# Patient Record
Sex: FEMALE | Race: WHITE | Employment: FULL TIME | URBAN - METROPOLITAN AREA
[De-identification: names, ages, dates, MRNs, and addresses within clinical notes are randomized per-mention and may not be internally consistent; named-entity substitution may affect disease eponyms.]

---

## 2017-02-10 ENCOUNTER — HOSPITAL ENCOUNTER (OUTPATIENT)
Dept: ULTRASOUND IMAGING | Age: 52
Discharge: HOME | End: 2017-02-10
Attending: FAMILY MEDICINE
Payer: COMMERCIAL

## 2017-02-10 DIAGNOSIS — R79.89 ELEVATED LFTS: ICD-10-CM

## 2017-02-10 PROCEDURE — 76700 US EXAM ABDOM COMPLETE: CPT

## 2017-02-11 ENCOUNTER — APPOINTMENT (OUTPATIENT)
Dept: CT IMAGING | Age: 52
End: 2017-02-11
Attending: EMERGENCY MEDICINE
Payer: COMMERCIAL

## 2017-02-11 ENCOUNTER — HOSPITAL ENCOUNTER (EMERGENCY)
Age: 52
Discharge: HOME | End: 2017-02-11
Attending: EMERGENCY MEDICINE
Payer: COMMERCIAL

## 2017-02-11 VITALS
RESPIRATION RATE: 16 BRPM | DIASTOLIC BLOOD PRESSURE: 94 MMHG | SYSTOLIC BLOOD PRESSURE: 140 MMHG | HEIGHT: 66 IN | OXYGEN SATURATION: 96 % | TEMPERATURE: 98.6 F | BODY MASS INDEX: 33.75 KG/M2 | HEART RATE: 86 BPM | WEIGHT: 210 LBS

## 2017-02-11 DIAGNOSIS — R10.13 ABDOMINAL PAIN, EPIGASTRIC: Primary | ICD-10-CM

## 2017-02-11 LAB
ALBUMIN SERPL BCP-MCNC: 3.6 G/DL (ref 3.5–5)
ALBUMIN/GLOB SERPL: 1.1 {RATIO} (ref 1.2–3.5)
ALP SERPL-CCNC: 181 U/L (ref 50–136)
ALT SERPL-CCNC: 240 U/L (ref 12–65)
ANION GAP BLD CALC-SCNC: 9 MMOL/L (ref 7–16)
AST SERPL W P-5'-P-CCNC: 299 U/L (ref 15–37)
BASOPHILS # BLD AUTO: 0 K/UL (ref 0–0.2)
BASOPHILS # BLD: 0 % (ref 0–2)
BILIRUB SERPL-MCNC: 0.8 MG/DL (ref 0.2–1.1)
BUN SERPL-MCNC: 15 MG/DL (ref 6–23)
CALCIUM SERPL-MCNC: 8 MG/DL (ref 8.3–10.4)
CHLORIDE SERPL-SCNC: 107 MMOL/L (ref 98–107)
CO2 SERPL-SCNC: 28 MMOL/L (ref 21–32)
CREAT SERPL-MCNC: 0.92 MG/DL (ref 0.6–1)
DIFFERENTIAL METHOD BLD: ABNORMAL
EOSINOPHIL # BLD: 0.3 K/UL (ref 0–0.8)
EOSINOPHIL NFR BLD: 3 % (ref 0.5–7.8)
ERYTHROCYTE [DISTWIDTH] IN BLOOD BY AUTOMATED COUNT: 12.8 % (ref 11.9–14.6)
GLOBULIN SER CALC-MCNC: 3.2 G/DL (ref 2.3–3.5)
GLUCOSE SERPL-MCNC: 167 MG/DL (ref 65–100)
HCT VFR BLD AUTO: 40.4 % (ref 35.8–46.3)
HGB BLD-MCNC: 13.9 G/DL (ref 11.7–15.4)
IMM GRANULOCYTES # BLD: 0 K/UL (ref 0–0.5)
IMM GRANULOCYTES NFR BLD AUTO: 0.1 % (ref 0–5)
LIPASE SERPL-CCNC: 200 U/L (ref 73–393)
LYMPHOCYTES # BLD AUTO: 17 % (ref 13–44)
LYMPHOCYTES # BLD: 1.4 K/UL (ref 0.5–4.6)
MCH RBC QN AUTO: 30.1 PG (ref 26.1–32.9)
MCHC RBC AUTO-ENTMCNC: 34.4 G/DL (ref 31.4–35)
MCV RBC AUTO: 87.4 FL (ref 79.6–97.8)
MONOCYTES # BLD: 0.6 K/UL (ref 0.1–1.3)
MONOCYTES NFR BLD AUTO: 8 % (ref 4–12)
NEUTS SEG # BLD: 5.8 K/UL (ref 1.7–8.2)
NEUTS SEG NFR BLD AUTO: 72 % (ref 43–78)
PLATELET # BLD AUTO: 295 K/UL (ref 150–450)
PMV BLD AUTO: 10 FL (ref 10.8–14.1)
POTASSIUM SERPL-SCNC: 3.5 MMOL/L (ref 3.5–5.1)
PROT SERPL-MCNC: 6.8 G/DL (ref 6.3–8.2)
RBC # BLD AUTO: 4.62 M/UL (ref 4.05–5.25)
SODIUM SERPL-SCNC: 144 MMOL/L (ref 136–145)
WBC # BLD AUTO: 8.1 K/UL (ref 4.3–11.1)

## 2017-02-11 PROCEDURE — 85025 COMPLETE CBC W/AUTO DIFF WBC: CPT | Performed by: EMERGENCY MEDICINE

## 2017-02-11 PROCEDURE — 99284 EMERGENCY DEPT VISIT MOD MDM: CPT | Performed by: EMERGENCY MEDICINE

## 2017-02-11 PROCEDURE — 74177 CT ABD & PELVIS W/CONTRAST: CPT

## 2017-02-11 PROCEDURE — 81003 URINALYSIS AUTO W/O SCOPE: CPT | Performed by: EMERGENCY MEDICINE

## 2017-02-11 PROCEDURE — 83690 ASSAY OF LIPASE: CPT | Performed by: EMERGENCY MEDICINE

## 2017-02-11 PROCEDURE — 74011000258 HC RX REV CODE- 258: Performed by: EMERGENCY MEDICINE

## 2017-02-11 PROCEDURE — 80053 COMPREHEN METABOLIC PANEL: CPT | Performed by: EMERGENCY MEDICINE

## 2017-02-11 PROCEDURE — 74011636320 HC RX REV CODE- 636/320: Performed by: EMERGENCY MEDICINE

## 2017-02-11 RX ORDER — PANTOPRAZOLE SODIUM 40 MG/1
40 TABLET, DELAYED RELEASE ORAL DAILY
Qty: 20 TAB | Refills: 0 | Status: SHIPPED | OUTPATIENT
Start: 2017-02-11 | End: 2017-03-03

## 2017-02-11 RX ORDER — SODIUM CHLORIDE 0.9 % (FLUSH) 0.9 %
10 SYRINGE (ML) INJECTION
Status: COMPLETED | OUTPATIENT
Start: 2017-02-11 | End: 2017-02-11

## 2017-02-11 RX ORDER — ONDANSETRON 4 MG/1
4 TABLET, ORALLY DISINTEGRATING ORAL
Qty: 20 TAB | Refills: 0 | Status: SHIPPED | OUTPATIENT
Start: 2017-02-11 | End: 2017-06-12 | Stop reason: CLARIF

## 2017-02-11 RX ORDER — HYOSCYAMINE SULFATE 0.125 MG
125 TABLET ORAL
Qty: 20 TAB | Refills: 0 | Status: SHIPPED | OUTPATIENT
Start: 2017-02-11 | End: 2017-06-12 | Stop reason: CLARIF

## 2017-02-11 RX ADMIN — Medication 10 ML: at 17:41

## 2017-02-11 RX ADMIN — SODIUM CHLORIDE 100 ML: 900 INJECTION, SOLUTION INTRAVENOUS at 17:41

## 2017-02-11 RX ADMIN — IOPAMIDOL 100 ML: 755 INJECTION, SOLUTION INTRAVENOUS at 17:41

## 2017-02-11 NOTE — ED PROVIDER NOTES
HPI Comments: 44 yo female with a history of HTN, DM, hyperlipidemia, hypothyroid, and remote breast cancer presents to ED with ~ 1 month of intermittent epigastric spasm type pain. No precipitating factors but vomiting makes pain better. Patient has seen her PMD regarding this issue and had an abdominal ultrasound yesterday that was essentially normal.  Patient came to ED today because she had attack earlier today that has since resolved. She denies fever/chills. No lower abdominal pain. No chest pain, back pain, or shortness of breath. Patient with previous cholecystectomy. Patient is a 45 y.o. female presenting with abdominal pain. The history is provided by the patient and medical records. Abdominal Pain    Associated symptoms include nausea and vomiting. Pertinent negatives include no fever, no diarrhea, no dysuria, no hematuria, no arthralgias and no chest pain.         Past Medical History:   Diagnosis Date    Cancer [facility])      Breast cancer at 33 yoa    Depression     Diabetes ([facility] [address]     HSV infection 9/16/2016    Hypercholesterolemia     Hypertension     Hypothyroid      2/2 radiation from breast cancer and thyroidectomy       Past Surgical History:   Procedure Laterality Date    Hx cholecystectomy       2008    Hx heent  2012     thyroid    Hx colonoscopy  2008     Dr Serena Carson Hx gyn  1993     hysterectomy    Hx breast lumpectomy Left 1994         Family History:   Problem Relation Age of Onset    Heart defect Mother     Hypertension Mother     High Cholesterol Mother     Diabetes Father     Hypertension Father     High Cholesterol Father     Cancer Maternal Grandmother     Heart Attack Maternal Grandfather     Diabetes Paternal Grandmother     Diabetes Paternal Grandfather        Social History     Social History    Marital status:      Spouse name: N/A    Number of children: N/A    Years of education: N/A     Occupational History          Social History Main Topics    Smoking status: Never Smoker    Smokeless tobacco: Never Used    Alcohol use No    Drug use: No    Sexual activity: Not on file     Other Topics Concern    Not on file     Social History Narrative    Live at home with . Step daughter that comes every other weekend. ALLERGIES: Metformin    Review of Systems   Constitutional: Negative for chills, fatigue and fever. HENT: Negative for congestion, rhinorrhea and sore throat. Eyes: Negative for pain and discharge. Respiratory: Negative for chest tightness and shortness of breath. Cardiovascular: Negative for chest pain, palpitations and leg swelling. Gastrointestinal: Positive for abdominal pain, nausea and vomiting. Negative for diarrhea. Endocrine: Negative for cold intolerance and heat intolerance. Genitourinary: Negative for dysuria, flank pain and hematuria. Musculoskeletal: Negative for arthralgias, joint swelling and neck stiffness. Skin: Negative for pallor and wound. Allergic/Immunologic: Negative for environmental allergies. Neurological: Negative for dizziness, syncope, speech difficulty, weakness, light-headedness and numbness. Hematological: Negative for adenopathy. Psychiatric/Behavioral: Negative for suicidal ideas. Vitals:    02/11/17 1434   BP: 109/73   Pulse: 90   Resp: 18   Temp: 99.4 °F (37.4 °C)   SpO2: 94%   Weight: 95.3 kg (210 lb)   Height: 5' 6\" (1.676 m)            Physical Exam   Constitutional: She is oriented to person, place, and time. She appears well-developed and well-nourished. No distress. HENT:   Head: Normocephalic and atraumatic. Eyes: Conjunctivae and EOM are normal.   Neck: Normal range of motion. Neck supple. No tracheal deviation present. Cardiovascular: Normal rate, regular rhythm and intact distal pulses. Pulmonary/Chest: Effort normal and breath sounds normal. No respiratory distress. Abdominal: Soft.    No tenderness with palpation of abdomen    Musculoskeletal: Normal range of motion. She exhibits no edema or tenderness. Neurological: She is alert and oriented to person, place, and time. She has normal strength. No cranial nerve deficit or sensory deficit. Skin: Skin is warm and dry. She is not diaphoretic. Psychiatric: She has a normal mood and affect. MDM  Number of Diagnoses or Management Options  Diagnosis management comments: Labs reviewed, elevated lives enzymes for which patient has had recently. CT A/P with no acute findings. Patient with intermittent epigastric abdominal pain and nausea/vomiting and abnormal liver enzymes, will need to follow up with GI for further workup.     ED Course       Procedures

## 2017-02-11 NOTE — DISCHARGE INSTRUCTIONS

## 2017-02-11 NOTE — ED TRIAGE NOTES
Pt reports upper abdominal pain since January this year. Pt states pain comes and goes. Pt reports vomiting 6 times today.

## 2017-02-13 NOTE — PROGRESS NOTES
Called and spoke to pt. Pt stated she got her ultrasound results when she was at the ER. Pt stated they gave her a name of a GI doctor and she is going to call. Advised that Dr. Evelyn Courtney has put in a referral for GI.

## 2017-02-17 ENCOUNTER — HOSPITAL ENCOUNTER (OUTPATIENT)
Dept: MRI IMAGING | Age: 52
Discharge: HOME | End: 2017-02-17
Attending: NURSE PRACTITIONER
Payer: COMMERCIAL

## 2017-02-17 DIAGNOSIS — R10.13 EPIGASTRIC ABDOMINAL PAIN: ICD-10-CM

## 2017-02-17 DIAGNOSIS — R74.8 ABNORMAL LIVER ENZYMES: ICD-10-CM

## 2017-02-17 PROCEDURE — 74181 MRI ABDOMEN W/O CONTRAST: CPT

## 2017-02-20 ENCOUNTER — HOSPITAL ENCOUNTER (OUTPATIENT)
Dept: LAB | Age: 52
Discharge: HOME | End: 2017-02-20

## 2017-02-20 PROCEDURE — 88312 SPECIAL STAINS GROUP 1: CPT | Performed by: INTERNAL MEDICINE

## 2017-02-20 PROCEDURE — 88305 TISSUE EXAM BY PATHOLOGIST: CPT | Performed by: INTERNAL MEDICINE

## 2017-03-10 ENCOUNTER — HOSPITAL ENCOUNTER (OUTPATIENT)
Dept: GENERAL RADIOLOGY | Age: 52
Discharge: HOME | End: 2017-03-10
Payer: COMMERCIAL

## 2017-03-10 DIAGNOSIS — T85.528A: ICD-10-CM

## 2017-03-10 PROCEDURE — 74000 XR ABD (KUB): CPT

## 2017-06-12 ENCOUNTER — ANESTHESIA EVENT (OUTPATIENT)
Dept: ENDOSCOPY | Age: 52
End: 2017-06-12
Payer: COMMERCIAL

## 2017-06-12 RX ORDER — SODIUM CHLORIDE 0.9 % (FLUSH) 0.9 %
5-10 SYRINGE (ML) INJECTION AS NEEDED
Status: CANCELLED | OUTPATIENT
Start: 2017-06-12

## 2017-06-12 RX ORDER — HYDROMORPHONE HYDROCHLORIDE 2 MG/ML
0.5 INJECTION, SOLUTION INTRAMUSCULAR; INTRAVENOUS; SUBCUTANEOUS
Status: CANCELLED | OUTPATIENT
Start: 2017-06-12

## 2017-06-12 RX ORDER — OXYCODONE HYDROCHLORIDE 5 MG/1
5 TABLET ORAL
Status: CANCELLED | OUTPATIENT
Start: 2017-06-12

## 2017-06-12 RX ORDER — OXYCODONE AND ACETAMINOPHEN 10; 325 MG/1; MG/1
1 TABLET ORAL AS NEEDED
Status: CANCELLED | OUTPATIENT
Start: 2017-06-12

## 2017-06-13 ENCOUNTER — ANESTHESIA (OUTPATIENT)
Dept: ENDOSCOPY | Age: 52
End: 2017-06-13
Payer: COMMERCIAL

## 2017-06-13 ENCOUNTER — HOSPITAL ENCOUNTER (OUTPATIENT)
Age: 52
Setting detail: OUTPATIENT SURGERY
Discharge: HOME | End: 2017-06-13
Attending: COLON & RECTAL SURGERY | Admitting: COLON & RECTAL SURGERY
Payer: COMMERCIAL

## 2017-06-13 VITALS
OXYGEN SATURATION: 95 % | HEIGHT: 66 IN | TEMPERATURE: 97.7 F | HEART RATE: 88 BPM | DIASTOLIC BLOOD PRESSURE: 89 MMHG | SYSTOLIC BLOOD PRESSURE: 139 MMHG | WEIGHT: 200 LBS | BODY MASS INDEX: 32.14 KG/M2 | RESPIRATION RATE: 18 BRPM

## 2017-06-13 LAB — GLUCOSE BLD STRIP.AUTO-MCNC: 215 MG/DL (ref 65–100)

## 2017-06-13 PROCEDURE — 82962 GLUCOSE BLOOD TEST: CPT

## 2017-06-13 PROCEDURE — 74011250636 HC RX REV CODE- 250/636: Performed by: ANESTHESIOLOGY

## 2017-06-13 PROCEDURE — 77030009426 HC FCPS BIOP ENDOSC BSC -B: Performed by: COLON & RECTAL SURGERY

## 2017-06-13 PROCEDURE — 74011000250 HC RX REV CODE- 250

## 2017-06-13 PROCEDURE — 76040000026: Performed by: COLON & RECTAL SURGERY

## 2017-06-13 PROCEDURE — 88305 TISSUE EXAM BY PATHOLOGIST: CPT | Performed by: COLON & RECTAL SURGERY

## 2017-06-13 PROCEDURE — 74011250636 HC RX REV CODE- 250/636

## 2017-06-13 PROCEDURE — 76060000032 HC ANESTHESIA 0.5 TO 1 HR: Performed by: COLON & RECTAL SURGERY

## 2017-06-13 RX ORDER — PROPOFOL 10 MG/ML
INJECTION, EMULSION INTRAVENOUS
Status: DISCONTINUED | OUTPATIENT
Start: 2017-06-13 | End: 2017-06-13 | Stop reason: HOSPADM

## 2017-06-13 RX ORDER — LIDOCAINE HYDROCHLORIDE 20 MG/ML
INJECTION, SOLUTION EPIDURAL; INFILTRATION; INTRACAUDAL; PERINEURAL AS NEEDED
Status: DISCONTINUED | OUTPATIENT
Start: 2017-06-13 | End: 2017-06-13 | Stop reason: HOSPADM

## 2017-06-13 RX ORDER — PROPOFOL 10 MG/ML
INJECTION, EMULSION INTRAVENOUS AS NEEDED
Status: DISCONTINUED | OUTPATIENT
Start: 2017-06-13 | End: 2017-06-13 | Stop reason: HOSPADM

## 2017-06-13 RX ORDER — SODIUM CHLORIDE, SODIUM LACTATE, POTASSIUM CHLORIDE, CALCIUM CHLORIDE 600; 310; 30; 20 MG/100ML; MG/100ML; MG/100ML; MG/100ML
75 INJECTION, SOLUTION INTRAVENOUS CONTINUOUS
Status: DISCONTINUED | OUTPATIENT
Start: 2017-06-13 | End: 2017-06-13 | Stop reason: HOSPADM

## 2017-06-13 RX ADMIN — LIDOCAINE HYDROCHLORIDE 40 MG: 20 INJECTION, SOLUTION EPIDURAL; INFILTRATION; INTRACAUDAL; PERINEURAL at 08:49

## 2017-06-13 RX ADMIN — PROPOFOL 180 MCG/KG/MIN: 10 INJECTION, EMULSION INTRAVENOUS at 08:49

## 2017-06-13 RX ADMIN — PROPOFOL 70 MG: 10 INJECTION, EMULSION INTRAVENOUS at 08:49

## 2017-06-13 RX ADMIN — SODIUM CHLORIDE, SODIUM LACTATE, POTASSIUM CHLORIDE, AND CALCIUM CHLORIDE 75 ML/HR: 600; 310; 30; 20 INJECTION, SOLUTION INTRAVENOUS at 08:07

## 2017-06-13 NOTE — DISCHARGE INSTRUCTIONS
Gastrointestinal Colonoscopy/Flexible Sigmoidoscopy - Lower Exam Discharge Instructions  1. Call Dr. Juan Ram at [phone] for any problems or questions. 2. Contact the doctors office for follow up appointment as directed  3. Medication may cause drowsiness for several hours, therefore, do not drive or operate machinery for remainder of the day. 4. No alcohol today. 5. Ordinarily, you may resume regular diet and activity after exam unless otherwise specified by your physician. 6. Because of air put into your colon during exam, you may experience some abdominal distension, relieved by the passage of gas, for several hours. 7. Contact your physician if you have any of the following:  a. Excessive amount of bleeding - large amount when having a bowel movement. Occasional specks of blood with bowel movement would not be unusual.  b. Severe abdominal pain  c. Fever or Chills  8. Polyp Removal - follow these additional instructions  a. Soft diet for 24 hours, then resume regular diet   b. Take Metamucil - 1 tablespoon in juice every morning for 3 days  c. No Aspirin, Advil, Aleve, Nuprin, Ibuprofen, or medications that contain these drugs for 2 weeks. Any additional instructions:    -Follow up with Dr Juan Ram in the office on an as-needed basis. -High fiber diet  -Repeat colonoscopy in 5 years due to history of polyps and polyps this exam      Instructions given to Sonido Granados and other family members.

## 2017-06-13 NOTE — ANESTHESIA PREPROCEDURE EVALUATION
Anesthetic History   No history of anesthetic complications            Review of Systems / Medical History  Patient summary reviewed and pertinent labs reviewed    Pulmonary        Sleep apnea           Neuro/Psych         Psychiatric history     Cardiovascular    Hypertension: well controlled              Exercise tolerance: <4 METS     GI/Hepatic/Renal     GERD: well controlled           Endo/Other    Diabetes: type 2  Hypothyroidism: well controlled  Morbid obesity     Other Findings              Physical Exam    Airway  Mallampati: II  TM Distance: > 6 cm  Neck ROM: normal range of motion   Mouth opening: Normal     Cardiovascular    Rhythm: regular           Dental         Pulmonary                 Abdominal  GI exam deferred       Other Findings            Anesthetic Plan    ASA: 3  Anesthesia type: total IV anesthesia          Induction: Intravenous  Anesthetic plan and risks discussed with: Patient

## 2017-06-13 NOTE — H&P
History and Physical    Patient: Cedric Lerma MRN: [id number]  SSN: [id number]    YOB: 1965  Age: 47 y.o. Sex: female      Subjective:      See my office note from 4/6/17. Cedric Lerma is a 47 y.o. female who I met to discuss colonoscopy. Had polyps in the past..     Past Medical History:   Diagnosis Date    Cancer [facility])     left Breast cancer at 33 yoa, lumpectomy    Depression     Diabetes ([facility] [address]     last hgba1c 6.9 in 03/2017, does not check daily BS, can not tell if BS drops    GERD (gastroesophageal reflux disease)     managed with meds    HSV infection 9/16/2016    Hypercholesterolemia     Hypertension     managed with meds    Hypothyroid     2/2 radiation from breast cancer and thyroidectomy    Sleep apnea     does not require cpap machine     Past Surgical History:   Procedure Laterality Date    HX BREAST LUMPECTOMY Left 1994    HX CHOLECYSTECTOMY      2008    HX COLONOSCOPY  2008    Dr Merle Deutsch    hysterectomy    HX HEENT  2012    thyroid      Family History   Problem Relation Age of Onset    Heart defect Mother     Hypertension Mother     High Cholesterol Mother     Diabetes Father     Hypertension Father     High Cholesterol Father     Cancer Maternal Grandmother     Heart Attack Maternal Grandfather     Diabetes Paternal Grandmother     Diabetes Paternal Grandfather     No Known Problems Brother      Social History   Substance Use Topics    Smoking status: Never Smoker    Smokeless tobacco: Never Used    Alcohol use No      Prior to Admission medications    Medication Sig Start Date End Date Taking? Authorizing Provider   pantoprazole (PROTONIX) 40 mg tablet daily. 3/28/17  Yes Historical Provider   EEMT HS 0.625-1.25 mg per tablet 1 Tab daily. 4/4/17  Yes Historical Provider   OTHER,NON-FORMULARY, Indications: plexus pink drink- vit supplements. Yes Historical Provider   SITagliptin (JANUVIA) 100 mg tablet Take 1 Tab by mouth daily. Indications: type 2 diabetes mellitus 4/6/17  Yes Juan Elizabeth, DO   lisinopril (PRINIVIL, ZESTRIL) 10 mg tablet Take 1 Tab by mouth daily. 4/6/17  Yes Arvjosiah Elizabeth, DO   dapagliflozin (FARXIGA) 5 mg tab tablet Take 1 Tab by mouth daily. Indications: type 2 diabetes mellitus 4/6/17  Yes Arvjosiah Elizabeth, DO   rosuvastatin (CRESTOR) 40 mg tablet Take 1 Tab by mouth nightly. Patient taking differently: Take 40 mg by mouth daily. 3/21/17  Yes Juan Elizabeth, DO   vortioxetine (TRINTELLIX) 10 mg tablet Take 1 Tab by mouth daily. 2/23/17  Yes Arvjosiah Elizabeth, DO   calcium carbonate (CALTREX) 600 mg calcium (1,500 mg) tablet Take 600 mg by mouth two (2) times a day. Yes Historical Provider   OTHER Indications: relief factor supplements: antiinflammatory   Yes Historical Provider   acyclovir (ZOVIRAX) 400 mg tablet Take 1 Tab by mouth daily. Patient taking differently: Take 400 mg by mouth daily as needed. 6/8/16  Yes Shonda Quezada PA-C   cholecalciferol, vitamin D3, (VITAMIN D3) 2,000 unit tab Take 10,000 Int'l Units by mouth. Yes Historical Provider   levothyroxine (SYNTHROID) 112 mcg tablet Take 1 Tab by mouth Daily (before breakfast). 12/27/16   Juan Elizabeth DO        Allergies   Allergen Reactions    Metformin Diarrhea       Review of Systems:  A comprehensive review of systems was negative except for that written in the History of Present Illness. Objective:     Vitals:    06/13/17 0740 06/13/17 0803   BP:  (!) 150/92   Pulse:  92   Resp:  16   Temp: 98.4 °F (36.9 °C)    SpO2:  97%   Weight: 200 lb (90.7 kg)    Height: 5' 6\" (1.676 m)         Physical Exam:  General:  Alert, cooperative, no distress, appears stated age. Eyes:  Conjunctivae/corneas clear. PERRL, EOMs intact. Fundi benign   Ears:  Normal TMs and external ear canals both ears. Nose: Nares normal. Septum midline. Mucosa normal. No drainage or sinus tenderness.    Mouth/Throat: Lips, mucosa, and tongue normal. Teeth and gums normal.   Neck: Supple, symmetrical, trachea midline, no adenopathy, thyroid: no enlargment/tenderness/nodules, no carotid bruit and no JVD. Back:   Symmetric, no curvature. ROM normal. No CVA tenderness. Lungs:   Clear to auscultation bilaterally. Heart:  Regular rate and rhythm, S1, S2 normal, no murmur, click, rub or gallop. Abdomen:   Soft, non-tender. Bowel sounds normal. No masses,  No organomegaly. Extremities: Extremities normal, atraumatic, no cyanosis or edema. Pulses: 2+ and symmetric all extremities. Skin: Skin color, texture, turgor normal. No rashes or lesions   Lymph nodes: Cervical, supraclavicular, and axillary nodes normal.   Neurologic: CNII-XII intact. Normal strength, sensation and reflexes throughout.        Assessment:     Age related colon cancer risk/screening  Personal history of colon/rectal polyps  Family history of colon polyps in father  History of IBS, currently improved       Plan:     colonoscopy    Signed By: Lawyer Randal MD     June 13, 2017

## 2017-06-13 NOTE — IP AVS SNAPSHOT
[address] 
[phone] Patient: Naye Mary MRN: [id number] :1965 You are allergic to the following Allergen Reactions Metformin Diarrhea Recent Documentation Height Weight BMI OB Status Smoking Status 1.676 m 90.7 kg 32.28 kg/m2 Hysterectomy Never Smoker Emergency Contacts Name Discharge Info Relation Home Work Mobile [place] CAREGIVER [3] Spouse [3] [phone] About your hospitalization You were admitted on:  2017 You last received care in the:  [facility] You were discharged on:  2017 Unit phone number:  [phone] Why you were hospitalized Your primary diagnosis was:  Not on File Providers Seen During Your Hospitalizations Provider Role Specialty Primary office phone Berenice Howard MD Attending Provider Colon and Rectal Surgery [phone] Your Primary Care Physician (PCP) Primary Care Physician Office Phone Office Fax [phone] [address] [phone] Follow-up Information Follow up With Details Comments Contact Info Al Moreau DO   [address] [facility] [address] 
[phone] Your Appointments 2017  8:40 AM EDT  
LAB with [facility] [address] ([address] [place] [address]) [address] [facility] [address] [phone] 2017  9:00 AM EDT  
ESTABLISHED PATIENT with Al Moreau DO  
[facility] ([address] [place] [address]) [address] [facility] [address] [phone] Current Discharge Medication List  
  
CONTINUE these medications which have CHANGED Dose & Instructions Dispensing Information Comments Morning Noon Evening Bedtime  
 acyclovir 400 mg tablet Commonly known as:  ZOVIRAX What changed:   
- when to take this 
- reasons to take this Your last dose was: Your next dose is:    
   
   
 Dose:  400 mg Take 1 Tab by mouth daily. Quantity:  90 Tab Refills:  2  
     
   
   
   
  
 rosuvastatin 40 mg tablet Commonly known as:  CRESTOR What changed:  when to take this Your last dose was: Your next dose is:    
   
   
 Dose:  40 mg Take 1 Tab by mouth nightly. Quantity:  90 Tab Refills:  1 CANCEL CRESTOR 20 MG and Zetia CONTINUE these medications which have NOT CHANGED Dose & Instructions Dispensing Information Comments Morning Noon Evening Bedtime  
 calcium carbonate 600 mg calcium (1,500 mg) tablet Commonly known as:  Refugia Priestly Your last dose was: Your next dose is:    
   
   
 Dose:  600 mg Take 600 mg by mouth two (2) times a day. Refills:  0  
     
   
   
   
  
 dapagliflozin 5 mg Tab tablet Commonly known as:  [employer] Your last dose was: Your next dose is:    
   
   
 Dose:  5 mg Take 1 Tab by mouth daily. Indications: type 2 diabetes mellitus Quantity:  30 Tab Refills:  2 CANCEL PIOGLITAZONE  
    
   
   
   
  
 EEMT HS 0.625-1.25 mg per tablet Generic drug:  estrogens (conjugated)-methylTESTOSTERone Your last dose was: Your next dose is:    
   
   
 Dose:  1 Tab 1 Tab daily. Refills:  0  
     
   
   
   
  
 levothyroxine 112 mcg tablet Commonly known as:  SYNTHROID Your last dose was: Your next dose is:    
   
   
 Dose:  112 mcg Take 1 Tab by mouth Daily (before breakfast). Quantity:  90 Tab Refills:  1  
     
   
   
   
  
 lisinopril 10 mg tablet Commonly known as:  Katelynn Harman Your last dose was: Your next dose is:    
   
   
 Dose:  10 mg Take 1 Tab by mouth daily. Quantity:  90 Tab Refills:  0  
     
   
   
   
  
 OTHER  
   
 Your last dose was: Your next dose is:    
   
   
 Indications: relief factor supplements: antiinflammatory Refills:  0  
     
   
   
   
  
 OTHER(NON-FORMULARY) Your last dose was: Your next dose is:    
   
   
 Indications: plexus pink drink- vit supplements. Refills:  0  
     
   
   
   
  
 pantoprazole 40 mg tablet Commonly known as:  PROTONIX Your last dose was: Your next dose is:    
   
   
 daily. Refills:  0 SITagliptin 100 mg tablet Commonly known as:  Murriel Bhargavi Your last dose was: Your next dose is:    
   
   
 Dose:  100 mg Take 1 Tab by mouth daily. Indications: type 2 diabetes mellitus Quantity:  30 Tab Refills:  5 Cancel metformin VITAMIN D3 2,000 unit Tab Generic drug:  cholecalciferol (vitamin D3) Your last dose was: Your next dose is:    
   
   
 Dose:  87895 Int'l Units Take 10,000 Int'l Units by mouth. Refills:  0  
     
   
   
   
  
 vortioxetine 10 mg tablet Commonly known as:  TRINTELLIX Your last dose was: Your next dose is:    
   
   
 Dose:  10 mg Take 1 Tab by mouth daily. Quantity:  30 Tab Refills:  5 Pt given discount card Discharge Instructions Gastrointestinal Colonoscopy/Flexible Sigmoidoscopy - Lower Exam Discharge Instructions 1. Call Dr. Acosta Ahmadi at [phone] for any problems or questions. 2. Contact the doctors office for follow up appointment as directed 3. Medication may cause drowsiness for several hours, therefore, do not drive or operate machinery for remainder of the day. 4. No alcohol today. 5. Ordinarily, you may resume regular diet and activity after exam unless otherwise specified by your physician. 6. Because of air put into your colon during exam, you may experience some abdominal distension, relieved by the passage of gas, for several hours. 7. Contact your physician if you have any of the following: 
a. Excessive amount of bleeding  large amount when having a bowel movement. Occasional specks of blood with bowel movement would not be unusual. 
b. Severe abdominal pain 
c. Fever or Chills 8. Polyp Removal  follow these additional instructions 
a. Soft diet for 24 hours, then resume regular diet  
b. Take Metamucil  1 tablespoon in juice every morning for 3 days 
c. No Aspirin, Advil, Aleve, Nuprin, Ibuprofen, or medications that contain these drugs for 2 weeks. Any additional instructions:   
-Follow up with Dr Africa Akins in the office on an as-needed basis. -High fiber diet 
-Repeat colonoscopy in 5 years due to history of polyps and polyps this exam 
 
 
Instructions given to Hyacinth Elders and other family members. Discharge Orders None Introducing [facility]! [employer] introduces [employer] patient portal. Now you can access parts of your medical record, email your doctor's office, and request medication refills online. 1. In your internet browser, go to [email or web address] 2. Click on the First Time User? Click Here link in the Sign In box. You will see the New Member Sign Up page. 3. Enter your [employer] Access Code exactly as it appears below. You will not need to use this code after youve completed the sign-up process. If you do not sign up before the expiration date, you must request a new code. · [employer] Access Code: [email or web address] Expires: 7/5/2017  8:49 AM 
 
4. Enter the last four digits of your Social Security Number (xxxx) and Date of Birth (mm/dd/yyyy) as indicated and click Submit. You will be taken to the next sign-up page. 5. Create a [employer] ID. This will be your [employer] login ID and cannot be changed, so think of one that is secure and easy to remember. 6. Create a [employer] password. You can change your password at any time. 7. Enter your Password Reset Question and Answer. This can be used at a later time if you forget your password. 8. Enter your e-mail address. You will receive e-mail notification when new information is available in [address] 9. Click Sign Up. You can now view and download portions of your medical record. 10. Click the Download Summary menu link to download a portable copy of your medical information. If you have questions, please visit the Frequently Asked Questions section of the [employer] website. Remember, [employer] is NOT to be used for urgent needs. For medical emergencies, dial 911. Now available from your iPhone and Android! General Information Please provide this summary of care documentation to your next provider. Patient Signature:  ____________________________________________________________ Date:  ____________________________________________________________  
  
Shala Lepee Provider Signature:  ____________________________________________________________ Date:  ____________________________________________________________

## 2017-06-13 NOTE — PROCEDURES
Colonoscopy Procedure Note    Jose Alfredo Trejo  1965  [id number]    Indications:  Screening colonoscopy, Family history of coloretal adenoma  (in father), Personal history of colon polyps    Pre-operative Diagnosis: Screening colonoscopy, Family history of coloretal adenoma  (in father), Personal history of colon polyps    Post-operative Diagnosis: Sigmoid polyp, diverticulosis    Surgeon: Francisco Toro MD    Referring Provider: Jevon Lewis DO    Sedation:  MAC anesthesia Propofol    Pre-Procedure Exam:  Airway: clear   Heart: normal S1and S2    Lungs: clear bilateral  Abdomen: soft, nontender, bowel sounds present and normal in all quadrants   Mental Status: awake, alert, and oriented to person, place, and time    Procedure in Detail:  Informed consent was obtained for the procedure after delineating the risks, benefits, and alternatives to the procedure. Specific risks of perforation (1/1000), hemorrhage (1/1000), missed lesions, adverse drug reaction, and aspiration were discussed. The patient was placed in the left lateral decubitus position. Based on the pre-procedure assessment, including review of the patient's medical history, medications, and allergies, moderate sedation was felt to be appropriate. The aforementioned medications were given in an incremental fashion to achieve adequate sedation. The patient was monitored continuously with ECG tracing, pulse oximetry, blood pressure monitoring, and direct observations. A rectal examination was performed and showed no external hemorrhoids. Small rectocele. The Olympus videocolonoscope [id number] was inserted per anus and advanced under direct vision to the terminal ileum. The quality of the colonic preparation was fair. Moderate washing/suctioning was required. Final views were very good. The colonoscope was slowly withdrawn using a to-and-fro and circumferential motion over 23 minutes with careful examination between folds. Retroflexion was performed in the rectum. Appropriate photodocumentation was obtained. Findings:   Rectum: Mild incidental internal hemorrhoids. No polyps, masses, bleeding, or mucosal abnormalities. Sigmoid: Moderate incidental diverticulosis. Single 4 mm polyp cold forceps excised. No masses, bleeding, or mucosal abnormalities. Descending Colon: Normal.  No polyps, masses, bleeding, or mucosal abnormalities. Transverse Colon: Normal. No polyps, masses, bleeding, or mucosal abnormalities. Ascending Colon: Normal.  No polyps, masses, bleeding, or mucosal abnormalities. Cecum: Normal.  No polyps, masses, bleeding, or mucosal abnormalities. Terminal Ileum: normal    Therapies:  1 complete polypectomy was performed using cold biopsy forceps and the polyp was  retrieved    Implants: None    Specimen:  specimen #1, one polyp, 4 mm in size, located in the rectosigmoid removed by cold biopsy and sent for pathology    Complications: None; patient tolerated the procedure well. EBL:  3    Recommendations:   -Await pathology.   -Repeat colonoscopy in 5 years due to history of polyps and polyp present on this exam.  -High fiber diet.    -Follow up with Dr Theodore Cruz in the office on an as-needed basis in the future    Renzo Duncan MD  6/13/2017  9:41 AM

## 2017-06-13 NOTE — ANESTHESIA POSTPROCEDURE EVALUATION
Post-Anesthesia Evaluation and Assessment    Patient: Cedric Lerma MRN: [id number]  SSN: [id number]    YOB: 1965  Age: 47 y.o. Sex: female       Cardiovascular Function/Vital Signs  Visit Vitals    /85    Pulse 76    Temp 36.5 °C (97.7 °F)    Resp 18    Ht 5' 6\" (1.676 m)    Wt 90.7 kg (200 lb)    SpO2 94%    BMI 32.28 kg/m2       Patient is status post total IV anesthesia anesthesia for Procedure(s):  COLONOSCOPY/ 34  ENDOSCOPIC POLYPECTOMY. Nausea/Vomiting: None    Postoperative hydration reviewed and adequate. Pain:  Pain Scale 1: Numeric (0 - 10) (06/13/17 7380)   Managed    Neurological Status: At baseline    Mental Status and Level of Consciousness: Arousable    Pulmonary Status:   O2 Device: Nasal cannula (06/13/17 0995)   Adequate oxygenation and airway patent    Complications related to anesthesia: None    Post-anesthesia assessment completed.  No concerns    Signed By: Taco Palomino MD     June 13, 2017

## 2017-06-14 NOTE — PROGRESS NOTES
Benign polyp, no risk of cancer (hyperplastic).   Repeat colonoscopy in 5 years due to history of polyps in past

## 2017-10-25 PROBLEM — N89.3 VAGINAL DYSPLASIA: Status: ACTIVE | Noted: 2017-08-18

## 2018-06-05 PROBLEM — F41.9 ANXIETY: Status: ACTIVE | Noted: 2018-06-05

## 2018-10-25 PROBLEM — G47.33 OSA (OBSTRUCTIVE SLEEP APNEA): Status: ACTIVE | Noted: 2018-10-25

## 2019-03-08 PROBLEM — E66.01 SEVERE OBESITY (HCC): Status: ACTIVE | Noted: 2019-03-08

## 2019-03-19 LAB — MAMMOGRAPHY, EXTERNAL: NORMAL

## 2019-05-23 PROBLEM — F33.2 SEVERE EPISODE OF RECURRENT MAJOR DEPRESSIVE DISORDER, WITHOUT PSYCHOTIC FEATURES (HCC): Status: ACTIVE | Noted: 2019-05-23

## 2019-05-23 PROBLEM — Z79.4 LONG-TERM INSULIN USE (HCC): Status: ACTIVE | Noted: 2019-05-23

## 2019-05-23 PROBLEM — Z85.3 HISTORY OF LEFT BREAST CANCER: Status: ACTIVE | Noted: 2019-05-23

## 2019-06-26 ENCOUNTER — APPOINTMENT (OUTPATIENT)
Dept: PHYSICAL THERAPY | Age: 54
End: 2019-06-26

## 2019-07-12 ENCOUNTER — HOSPITAL ENCOUNTER (OUTPATIENT)
Dept: PHYSICAL THERAPY | Age: 54
Discharge: HOME | End: 2019-07-12
Payer: COMMERCIAL

## 2019-07-12 PROCEDURE — 97162 PT EVAL MOD COMPLEX 30 MIN: CPT

## 2019-07-12 PROCEDURE — 97140 MANUAL THERAPY 1/> REGIONS: CPT

## 2019-07-12 NOTE — THERAPY EVALUATION
Jerica Canela  : 1965  Primary: Kathrin Acosta  Secondary:  [address]  at [facility]  [address], [place] [address] [facility]  [phone]   [phone]        OUTPATIENT PHYSICAL THERAPY:Initial Assessment 2019   ICD-10: Treatment Diagnosis: Pain in right ankle and joints of right foot (M25.571)    Difficulty in walking, not elsewhere classified (R26.2)  Precautions/Allergies:   Metformin and Other plant, animal, environmental   TREATMENT PLAN:  Effective Dates/Frequency/Duration: Twice per week from 2019 until 2019 (6 weeks). MEDICAL/REFERRING DIAGNOSIS:  Achilles tendon pain [M76.60]   DATE OF ONSET: 2018  REFERRING PHYSICIAN: Erik Ormond MD Orders: Evaluate and treat  Return MD Appointment: unspecified     INITIAL ASSESSMENT:  Jerica Canela is a 53 y.o. female who presents to physical therapy for R ankle/foot pain starting in 2018. This session, pt demonstrated decreased B LE strength/endurance, decreased ankle mobility (bilateral) with associated pain, multiple postural/gait deficits and decreased functional mobility as evident by a score of 79/84 on the Foot and Ankle Ability Measure (with lower scores indicating increased disability) and decreased ability to perform driving without pain. Pt may benefit from skilled PT to address the above listed deficits to improve ability to perform pain-free IADLs and to improve overall quality of life prior to discharge. PROBLEM LIST (Impacting functional limitations):  1. Decreased Strength  2. Decreased ADL/Functional Activities  3. Decreased Ambulation Ability/Technique  4. Increased Pain  5. Decreased Activity Tolerance  6. Decreased Work Simplification/Energy Conservation Techniques  7. Increased Fatigue  8. Decreased Flexibility/Joint Mobility  9. Edema/Girth INTERVENTIONS PLANNED: (Treatment may consist of any combination of the following)  1.  Balance Exercise  2. Bed Mobility  3. Cold  4. Electrical Stimulation  5. Family Education  6. Gait Training  7. Heat  8. Home Exercise Program (HEP)  9. Manual Therapy  10. Neuromuscular Re-education/Strengthening  11. Range of Motion (ROM)  12. Therapeutic Activites  13. Therapeutic Exercise/Strengthening  14. Transcutaneous Electrical Nerve Stimulation (TENS)  15. Transfer Training  16. Ultrasound (US)     GOALS: (Goals have been discussed and agreed upon with patient.)  Discharge Goals: Time Frame: 6 weeks  1. Pt will be independent with HEP in order to increase LE strength/endurance/mobility to improve functional mobility and overall quality of life. 2. Pt will improve score on the Foot and Ankle Ability Measure to 84/84 in order to demonstrate improved functional mobility and quality of life. 3. Pt will increase bilateral ankle to 15 degrees with minimal to no increase in pain in order to improve functional mobility and reduce gait deficits. 4. Pt will report being able to drive for 5 consecutive days with no reports of increased pain in order to demonstrate improved quality of life. OUTCOME MEASURE:   Tool Used: PT/OT FOOT AND ANKLE ABILITY MEASURE  Score:  Initial: 79/84 Most Recent: X (Date: -- )   Interpretation of Score: For the \"Activities of Daily Living\", there are 21 questions each scored on a 5 point scale with 0 representing \"Unable to do\" and 4 representing \"No difficulty\". The lower the score, the greater the functional disability. 84/84 represents no disability. Minimal detectable change is 5.7 points. With the addition of the 8 questions in the \"Sports Subscale,\" there are 29 questions, each scored on a 5 point scale with 0 representing \"Unable to do\" and 4 representing \"No difficulty\". The lower the score, the greater the functional disability. 116/116 represents no disability. Minimal detectable change is 12.3 points.     UPDATED OBJECTIVE FINDINGS:     Initial assessment only today: see objective section below for details    FALL RISK:    Ambulatory/Rehab Services H2 Model Falls Risk Assessment   Risk Factors:       No Risk Factors Identified Ability to Rise from Chair:       (0)  Ability to rise in a single movement   Falls Prevention Plan:       No modifications necessary   Total: (5 or greater = High Risk): 0   ©2010 [facility]. All Rights Reserved. [place] States Patent #0,914,166. Federal Law prohibits the replication, distribution or use without written permission from [facility] of [address] [place] [address] Avenue:   · Patient is expected to demonstrate progress in strength, range of motion, balance and functional technique to improve ability to perform pain-free IADLs and to improve overall quality of life. REASON FOR SERVICES/OTHER COMMENTS:  · Patient continues to require modification of therapeutic interventions to increase complexity of exercises. Total Duration:  PT Patient Time In/Time Out  Time In: 1300  Time Out: 1405    Rehabilitation Potential For Stated Goals: Good  Regarding Graciela KEISHA Gaokeisha's therapy, I certify that the treatment plan above will be carried out by a therapist or under their direction. Thank you for this referral,  Dina Nguyen DPT     Referring Physician Signature: Emelia Downs PA-C _______________________________ Date _____________     PAIN/SUBJECTIVE:   Initial: pt stating minimal pain Post Session:  No pain reported at end of session   HISTORY:   History of Injury/Illness (Reason for Referral): *History per pt or pt's family except where otherwise noted  Pt states in December 2018 she was working about 10-12 hours a day, and she started getting pain and swelling in her R posterior ankle and achilles tendon. States when her hours decreased, the pain and swelling reduced again, but it has not completely healed according to pt. States occasionally the pain wakes her up because the pain is throbbing/pounding.  Pt wants to be able to go back to gym and workout. Pt states pain is sometimes a burning sensation in R heel. Pt states pain at worst is \"12\"/10 (aggravating activities include driving in vehicle due to positioning of foot, walking on treadmill > 10 minutes, working out on elliptical >5 minutes - partially due to knee problems). Pain at best is 0/10 (eases pain with resting, elevation, ice, anti-inflammatory, stretching). Pt states normal household chores and ADLs do not seem to aggravate the pain. Past Medical History/Comorbidities:   Ms. Kendra Andino  has a past medical history of Cancer ([facility] [address] Depression, Diabetes ([facility] [address] Diverticulosis (2017), GERD (gastroesophageal reflux disease), HSV infection (9/16/2016), colonic polyps (2017), Hypercholesterolemia, Hypertension, Hypothyroid, and Sleep apnea. She also has no past medical history of Difficult intubation, Malignant hyperthermia due to anesthesia, Nausea & vomiting, or Pseudocholinesterase deficiency. Ms. Kendra Andino  has a past surgical history that includes hx cholecystectomy; hx colonoscopy (2008); hx breast lumpectomy (Left, 1994); colonoscopy (N/A, 6/13/2017); hx hysterectomy (1993); and hx thyroidectomy (2012). 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 Current Medications:       Current Outpatient Medications:     insulin glargine U-300 conc (TOUJEO SOLOSTAR U-300 INSULIN) 300 unit/mL (1.5 mL) inpn pen, 34 Units by SubCUTAneous route daily. , Disp: 8 Pen, Rfl: 1    CANNABIDIOL, CBD, EXTRACT PO, Take  by mouth., Disp: , Rfl:    OTHER, Indications: tummeric, Disp: , Rfl:     SITagliptin-metFORMIN (JANUMET XR) 100-1,000 mg TM24, Take 1 Tab by mouth daily. , Disp: 90 Tab, Rfl: 1    glucose blood VI test strips (ONETOUCH ULTRA TEST) strip, [employer] please; Check BS bid (E11.65), Disp: 200 Strip, Rfl: 11    lisinopril (PRINIVIL, ZESTRIL) 10 mg tablet, Take 1 Tab by mouth daily. , Disp: 90 Tab, Rfl: 1    rosuvastatin (CRESTOR) 40 mg tablet, Take 1 Tab by mouth nightly., Disp: 90 Tab, Rfl: 1    OTHER, , Disp: , Rfl:     pantoprazole (PROTONIX) 40 mg tablet, TAKE ONE TABLET BY MOUTH ONCE DAILY FOR  GASTROESOPHAGEAL REFLUX, Disp: 90 Tab, Rfl: 1    OTHER,NON-FORMULARY,, , Disp: , Rfl:     levothyroxine (SYNTHROID) 112 mcg tablet, Take 1 Tab by mouth Daily (before breakfast). , Disp: 90 Tab, Rfl: 1    vilazodone (VIIBRYD) 20 mg tab tablet, Take 1 Tab by mouth daily. , Disp: 90 Tab, Rfl: 1    raNITIdine (ZANTAC) 150 mg tablet, Take 150 mg by mouth as needed for Indigestion. , Disp: , Rfl:     Insulin Needles, Disposable, 31 gauge x 5/16\" ndle, 1 needle daily with insulin, Disp: 100 Package, Rfl: 4    magnesium 250 mg tab, Take  by mouth daily. , Disp: , Rfl:     Insulin Needles, Disposable, (BD INSULIN PEN NEEDLE UF MINI) 31 gauge x 3/16\" ndle, 1 Each by Does Not Apply route daily. , Disp: 100 Pen Needle, Rfl: 4    estrogens, conjugated,-methylTESTOSTERone (ESTRATEST HS) 0.625-1.25 mg per tablet, Take 1 Tab by mouth daily. , Disp: , Rfl:     MULTIVIT-MINERALS/FERROUS FUM (MULTI VITAMIN PO), Take  by mouth daily.  Indications: [employer], Disp: , Rfl:     calcium carbonate (CALTREX) 600 mg calcium (1,500 mg) tablet, Take 600 mg by mouth two (2) times a day., Disp: , Rfl:     cholecalciferol, vitamin D3, (VITAMIN D3) 2,000 unit tab, Take 10,000 Int'l Units by mouth., Disp: , Rfl:    Date Last Reviewed:  7/12/2019    Number of Personal Factors/Comorbidities that affect the Plan of Care: 1-2: MODERATE COMPLEXITY   EXAMINATION:   Initial assessment on 7/12/2019   Observation/Orthostatic Postural Assessment:    The following postural deficits were noted in sitting: loss of cervical lordosis, increased thoracic kyphosis, forward head, rounded shoulders and posterior pelvic tilt   The following postural deficits were noted in standing: supinated B foot  (pt has high arches)  Palpation:          Tenderness to palpation at posterior R heel and achilles tendon, as well as at R plantar fascia  ROM:    Initial measurement: (on 7/12/2019) Initial measurement: (on 7/12/2019) Reassessment measurement:  Reassessment measurement:      L ankle AROM:     Ankle dorsiflexion: 10 degrees     Ankle plantarflexion: 54 degrees  L ankle PROM:     Ankle dorsiflexion: 10 degrees     Ankle plantarflexion: 65 degrees (slight pain with overpressure)     Ankle inversion: 40 degrees     Ankle eversion:  18 degrees R ankle AROM:     Ankle dorsiflexion: 9 degrees     Ankle plantarflexion: 62 degrees  R ankle PROM:     Ankle dorsiflexion: 12 degrees (slight pain with overpressure)     Ankle plantarflexion: 70 degrees     Ankle inversion: 45 degrees     Ankle eversion: 18 degrees         Strength:     Initial measurement: (on 7/12/2019) Initial measurement: (on 7/12/2019)  Reassessment Reassessment    L LE: R LE:     Hip flexion  5/5  5/5      Hip extension 4-/5  4-/5      Hip abduction 4-/5  4/5      Hip adduction 4/5  4-/5      Hip IR  5/5  5/5      Hip ER 5/5  4+/5      Knee flexion 4+/5  4-/5      Knee extension 4+/5  5/5      Ankle DF  5/5  5/5        Special Tests:          -  Neurological Screen:        Myotomes:  [facility]        Dermatomes:  [facility]  Functional Mobility:   Gait/Ambulation: Pt ambulates with no AD with following gait deficits: decreased trunk rotation, narrow NADIRA, decreased B step length and decreased B heel strike         Transfers:  [facility]        Bed Mobility:  [facility]  Balance:          Good in static/dynamic standing  Coordination:          [facility]  Sensation:         [facility] Body Structures Involved:  1. Nerves  2. Bones  3. Joints  4. Muscles  5. Ligaments Body Functions Affected:  1. Sensory/Pain  2. Neuromusculoskeletal  3. Movement Related Activities and Participation Affected:  1. General Tasks and Demands  2. Mobility  3. Self Care  4. Domestic Life  5. Interpersonal Interactions and Relationships  6.  Community, Social and [place]   Number of elements (examined above) that affect the Plan of Care: 4+: HIGH COMPLEXITY   CLINICAL PRESENTATION:   Presentation: Evolving clinical presentation with changing clinical characteristics: MODERATE COMPLEXITY   CLINICAL DECISION MAKING:   Use of outcome tool(s) and clinical judgement create a POC that gives a: Questionable prediction of patient's progress: MODERATE COMPLEXITY

## 2019-07-12 NOTE — PROGRESS NOTES
Hussein Barber  : 1965  Primary: Ester Pham [facility]  Secondary:  [address]  [facility] [facility], [place] [address] [address] [facility]  [phone]   [phone]       OUTPATIENT PHYSICAL THERAPY: Daily Treatment Note 2019  Visit Count:  1  ICD-10: Treatment Diagnosis: Pain in right ankle and joints of right foot (M25.571)    Difficulty in walking, not elsewhere classified (R26.2)  Precautions/Allergies:   Metformin and Other plant, animal, environmental   TREATMENT PLAN:  Effective Dates/Frequency/Duration: Twice per week from 2019 until 2019 (6 weeks). Pre-treatment Symptoms/Complaints:  See history above. Pain: Initial:   pt stating minimal pain Post Session:  No pain reported   Medications Last Reviewed:  2019  Updated Objective Findings: See evaluation note from today   TREATMENT:   MANUAL THERAPY: (11 minutes): Joint mobilization and Soft tissue mobilization was utilized and necessary because of the patient's restricted joint motion, painful spasm, loss of articular motion and restricted motion of soft tissue. With pt in supported supine position (B LEs elevated on wedge), STM to connective tissue at plantar surface of R foot and cross friction massage along R achilles tendon to reduce muscular tightness and pain. Modalities (10 minutes): With pt in supported supine position, cold pack (with pillowcase layer) applied to R ankle/foot to reduce pain and inflammation at end of session. Treatment/Session Summary:    · Response to Treatment:  See assessment in chart. No adverse reactions/unusual changes observed/reported in clinical status this session.   · Communication/Consultation:  None today  · Equipment provided today:  None today  · Recommendations/Intent for next treatment session: Next visit will focus on manual therapy/modalities to reduce muscular tightness and pain; ankle strengthening/stretching exercises; physiostep/nustep okay.     Total Treatment Billable Duration:  11 minutes  PT Patient Time In/Time Out  Time In: 1300  Time Out: 911 Natalie Jensen DPT    Future Appointments   Date Time Provider Sarmad Thakur   7/15/2019 10:15 AM Jim Howard PTA [facility]   7/19/2019 11:00 AM Zayra ARMSTRONG DPT [facility] D   12/3/2019  8:20 AM PFP LAB [facility] PFP   12/11/2019  9:00 AM Chinmay Pappas PA-C [facility] PFP PFP

## 2019-07-15 ENCOUNTER — HOSPITAL ENCOUNTER (OUTPATIENT)
Dept: PHYSICAL THERAPY | Age: 54
Discharge: HOME | End: 2019-07-15
Payer: COMMERCIAL

## 2019-07-15 PROCEDURE — 97110 THERAPEUTIC EXERCISES: CPT

## 2019-07-15 NOTE — PROGRESS NOTES
Frieda Valdezna  : 1965  Primary: Margie Elizondo   Secondary:  [address]  at   [address], [address], [place], [facility] [address] [facility]  [phone]   [phone]       OUTPATIENT PHYSICAL THERAPY: Daily Treatment Note 7/15/2019  Visit Count:  2  ICD-10: Treatment Diagnosis: Pain in right ankle and joints of right foot (M25.571)    Difficulty in walking, not elsewhere classified (R26.2)  Precautions/Allergies:   Metformin and Other plant, animal, environmental   TREATMENT PLAN:  Effective Dates/Frequency/Duration: Twice per week from 2019 until 2019 (6 weeks). Pre-treatment Symptoms/Complaints:  See history above. Pain: Initial:   pt stating minimal pain Post Session:  No pain reported   Medications Last Reviewed:  7/15/2019  Updated Objective Findings: See evaluation note from today   TREATMENT:   THERAPEUTIC EXERCISE: (50 minutes):  Exercises per grid below to improve mobility, strength, balance and endurance. Required minimal verbal cues to promote proper body alignment and promote proper body posture. Date:  7-15-19 Date:   Date:     Activity/Exercise Parameters Parameters Parameters   Physiostep  10 minutes  Level 1     Standing on Blue foam  1 min      Standing on blue foam - with trunk rotation X 10      Standing on blue foam - with flexion /extension X 10      Incline board 3 x 30 sec B     Rocker board  A/P & M/L   Tapping x 20 then static hold 1 min each     Ankle DF/PF Yellow x 20 R each direction      Ankle Inversion/Eversion Yellow x 20 R each direction                            MANUAL THERAPY: ( minutes): Joint mobilization and Soft tissue mobilization was utilized and necessary because of the patient's restricted joint motion, painful spasm, loss of articular motion and restricted motion of soft tissue.  With pt in supported supine position (B LEs elevated on wedge), STM to connective tissue at plantar surface of R foot and cross friction massage along R achilles tendon to reduce muscular tightness and pain. Modalities ( minutes): With pt in supported supine position, cold pack (with pillowcase layer) applied to R ankle/foot to reduce pain and inflammation at end of session. Treatment/Session Summary:    · Response to Treatment:  No difficulties noted. Patient given yellow thera-band for home use. Patient very talkative and had to keep her on task at hand. No adverse reactions/unusual changes observed/reported in clinical status this session. · Communication/Consultation:  None today  · Equipment provided today:  None today  · Recommendations/Intent for next treatment session: Next visit will focus on manual therapy/modalities to reduce muscular tightness and pain; ankle strengthening/stretching exercises; physiostep/nustep okay.     Total Treatment Billable Duration:  50 minutes  PT Patient Time In/Time Out  Time In: 1015  Time Out: [place], [place]    Future Appointments   Date Time Provider Sarmad Thakur   7/19/2019 11:00 AM Saintclair Alken, DPT [facility]   12/3/2019  8:20 AM PFP LAB [facility] P[facility] PFP   12/11/2019  9:00 AM Mable Greer PA-C [facility] PFP PFP

## 2019-07-19 ENCOUNTER — HOSPITAL ENCOUNTER (OUTPATIENT)
Dept: PHYSICAL THERAPY | Age: 54
Discharge: HOME | End: 2019-07-19
Payer: COMMERCIAL

## 2019-07-19 PROCEDURE — 97110 THERAPEUTIC EXERCISES: CPT

## 2019-07-19 NOTE — PROGRESS NOTES
Estuardo Class  : 1965  Primary: [facility]  Secondary:  [address]  at [facility]  [place] [address] [facility] [address], [address], [place], [address] [facility]  [phone]   [phone]       OUTPATIENT PHYSICAL THERAPY: Daily Treatment Note 2019  Visit Count:  3  ICD-10: Treatment Diagnosis: Pain in right ankle and joints of right foot (M25.571)    Difficulty in walking, not elsewhere classified (R26.2)  Precautions/Allergies:   Metformin and Other plant, animal, environmental   TREATMENT PLAN:  Effective Dates/Frequency/Duration: Twice per week from 2019 until 2019 (6 weeks). Pre-treatment Symptoms/Complaints:  Pt states she has been wearing her orthopedic inserts this entire week, and her feet were only a little sore on Wednesday morning but other than that she has felt fine and has continued to wear the orthotics  Pain: Initial:   0/10 Post Session:  No pain reported   Medications Last Reviewed:  2019  Updated Objective Findings: None Today   TREATMENT:   THERAPEUTIC EXERCISE: (38 minutes):  Exercises per grid below to improve mobility, strength, balance and endurance. Required minimal verbal cues to promote proper body alignment and promote proper body posture.      Date:  7-15-19 Date:  2019 Date:     Activity/Exercise Parameters Parameters Parameters   Physiostep  10 minutes  Level 1 10 minutes  Level 2    Standing on Blue foam  1 min  Feet apart x 60 seconds with minimal to no ankle instability  Feet together x 60 seconds with minimal ankle instability  Feet in semi-tandem x 30-60 seconds with minimal to moderate ankle instability  *shoes off    Standing on blue foam - with trunk rotation X 10  10 reps/1 set each direction with cues to increase motion to increase challenge; performed with shoes off; minimal to moderate ankle instability    Standing on blue foam - with flexion /extension X 10  10 reps/1 set each direction with minimal ankle instability Incline board 3 x 30 sec B 30 second hold x 3 reps/1 set with knees extended then flexed    Rocker board  A/P & M/L   Tapping x 20 then static hold 1 min each Tapping anteriorly/posteriorly and medially/laterally x 20 reps/1 set each direction  Holding 1 minute in the middle each direction    Ankle DF/PF Yellow x 20 R each direction  Yellow x 20 R each direction    Ankle Inversion/Eversion Yellow x 20 R each direction  Yellow x 20 R each direction                          MANUAL THERAPY: ( minutes): Joint mobilization and Soft tissue mobilization was utilized and necessary because of the patient's restricted joint motion, painful spasm, loss of articular motion and restricted motion of soft tissue. With pt in supported supine position (B LEs elevated on wedge), STM to connective tissue at plantar surface of R foot and cross friction massage along R achilles tendon to reduce muscular tightness and pain. Modalities ()    Treatment/Session Summary:    · Response to Treatment:  Pt very talkative throughout session but performing most exercises with good technique and no reports of increased pain. Will progress exercises as tolerated next session. No adverse reactions/unusual changes observed/reported in clinical status this session. · Communication/Consultation:  None today  · Equipment provided today:  None today  · Recommendations/Intent for next treatment session: Next visit will focus on manual therapy/modalities to reduce muscular tightness and pain; ankle strengthening/stretching exercises; physiostep/nustep okay.     Total Treatment Billable Duration:  38 minutes  PT Patient Time In/Time Out  Time In: 4299  Time Out: [address] [place], DPT    Future Appointments   Date Time Provider Sarmad Thakur   7/24/2019  8:45 AM Ilya ARMSTRONG DPT [facility]   7/30/2019  1:45 PM Ilya ARMSTRONG DPT [facility]   12/3/2019  8:20 AM PFP LAB [facility]   12/11/2019  9:00 AM Georgi Yoder PA-C [facility] PFP

## 2019-07-24 ENCOUNTER — HOSPITAL ENCOUNTER (OUTPATIENT)
Dept: PHYSICAL THERAPY | Age: 54
Discharge: HOME | End: 2019-07-24
Payer: COMMERCIAL

## 2019-07-24 PROCEDURE — 97110 THERAPEUTIC EXERCISES: CPT

## 2019-07-30 ENCOUNTER — HOSPITAL ENCOUNTER (OUTPATIENT)
Dept: PHYSICAL THERAPY | Age: 54
Discharge: HOME | End: 2019-07-30
Payer: COMMERCIAL

## 2019-07-30 PROCEDURE — 97110 THERAPEUTIC EXERCISES: CPT

## 2019-07-30 NOTE — PROGRESS NOTES
Mercy Cota  : 1965  Primary: [employer] [facility]  Secondary:  [address]  at [facility]  [address], [address], [place], [facility] [address] [facility]  [phone]   [phone]       OUTPATIENT PHYSICAL THERAPY: Daily Treatment Note 2019  Visit Count:  5  ICD-10: Treatment Diagnosis: Pain in right ankle and joints of right foot (M25.571)    Difficulty in walking, not elsewhere classified (R26.2)  Precautions/Allergies:   Metformin and Other plant, animal, environmental   TREATMENT PLAN:  Effective Dates/Frequency/Duration: Twice per week from 2019 until 2019 (6 weeks). Pre-treatment Symptoms/Complaints:  Pt states she wore sandals one day last week and her R heel was hurting as well as B medial knees (pt states she went ahead and got rid of the shoes); pt states she worked over 10 hours yesterday; pt states she has been relatively pain-free other than the day last week  Pain: Initial:   0/10 Post Session:  No pain reported   Medications Last Reviewed:  2019  Updated Objective Findings: None Today   TREATMENT:   THERAPEUTIC EXERCISE: (38 minutes):  Exercises per grid below to improve mobility, strength, balance and endurance. Required minimal verbal cues to promote proper body alignment and promote proper body posture.      Date:  7-15-19 Date:  2019 Date:  2019 Date:  2019   Activity/Exercise Parameters Parameters Parameters    Physiostep  10 minutes  Level 1 10 minutes  Level 2 12 minutes  Level 2 13 minutes  Level 2   Standing on Blue foam  1 min  Feet apart x 60 seconds with minimal to no ankle instability  Feet together x 60 seconds with minimal ankle instability  Feet in semi-tandem x 30-60 seconds with minimal to moderate ankle instability  *shoes off Feet together x 60 seconds with minimal to no ankle instability  Feet in semi-tandem x 30-60 seconds with minimal to moderate ankle instability  *shoes off Feet together x 60 seconds with minimal to no ankle instability  Feet in semi-tandem x 30-60 seconds with minimal to moderate ankle instability  *shoes off   Standing on blue foam - with trunk rotation X 10  10 reps/1 set each direction with cues to increase motion to increase challenge; performed with shoes off; minimal to moderate ankle instability     Standing on blue foam - with flexion /extension X 10  10 reps/1 set each direction with minimal ankle instability     Incline board 3 x 30 sec B 30 second hold x 3 reps/1 set with knees extended then flexed 30 second hold x 3 reps/1 set with knees extended then flexed 30 second hold x 3 reps/1 set with knees extended then flexed   [employer] board  A/P & M/L   Tapping x 20 then static hold 1 min each Tapping anteriorly/posteriorly and medially/laterally x 20 reps/1 set each direction  Holding 1 minute in the middle each direction     Ankle DF/PF Yellow x 20 R each direction  Yellow x 20 R each direction     Ankle Inversion/Eversion Yellow x 20 R each direction  Yellow x 20 R each direction     Heel/toe raises in standing on blue foam   20 reps/1 set with no UE support and cues for increased motion  *no shoes 20 reps/1 set with feet together with no UE support and cues for increased motion  *no shoes   Standing LE marching on foam   20 reps/1 set with cues for increased control with minimal to no UE support  *no shoes 20 reps/1 set with cues for increased control with minimal to no UE support  *no shoes   Leg press machine   25 lb resistance; 20 reps/1 set with cues for slower movement and slightly increased knee/hip flexion with each rep 30 lb resistance; 20 reps/1 set with cues for slower movement and slightly increased knee/hip flexion with each rep   Standing lunges   3-4 reps/1 set each LE forward with cues to avoid movement forward     Standing hip abduction   10 reps/1 set each LE with cues for slower movement and minimal to no UE support                      MANUAL THERAPY: ( minutes):  Modalities ()    Treatment/Session Summary:    · Response to Treatment:  Pt demonstrating good ability to perform all exercises this session with no reports of increased pain. Discussed potential discharge next week if pt continues to be pain-free. Pt agreeable to this. No adverse reactions/unusual changes observed/reported in clinical status this session. · Communication/Consultation:  None today  · Equipment provided today:  None today  · Recommendations/Intent for next treatment session: Next visit will focus on manual therapy/modalities to reduce muscular tightness and pain; ankle strengthening/stretching exercises; physiostep/nustep okay.     Total Treatment Billable Duration:  38 minutes  PT Patient Time In/Time Out  Time In: 9755  Time Out: [address] DPT    Future Appointments   Date Time Provider Sarmad Thakur   8/7/2019  8:00 AM Saintclair Alken, DPT [facility]   12/3/2019  8:20 AM PFP LAB [facility] PFP   12/11/2019  9:00 AM Mable Greer PA-C [facility] PFP PFP

## 2019-08-07 ENCOUNTER — HOSPITAL ENCOUNTER (OUTPATIENT)
Dept: PHYSICAL THERAPY | Age: 54
Discharge: HOME | End: 2019-08-07
Payer: COMMERCIAL

## 2019-08-07 PROCEDURE — 97110 THERAPEUTIC EXERCISES: CPT

## 2019-08-07 NOTE — PROGRESS NOTES
Zenia Zhang  : 1965  Primary: Anish Acosta  Secondary:  [address] [employer]  at [facility]  [address], [address], [place], [place] [facility] [address] [facility]  [phone]   [phone]       OUTPATIENT PHYSICAL THERAPY: Daily Treatment Note 2019  Visit Count:  6  ICD-10: Treatment Diagnosis: Pain in right ankle and joints of right foot (M25.571)    Difficulty in walking, not elsewhere classified (R26.2)  Precautions/Allergies:   Metformin and Other plant, animal, environmental   TREATMENT PLAN:  Effective Dates/Frequency/Duration: Twice per week from 2019 until 2019 (6 weeks). Pre-treatment Symptoms/Complaints:  Pt states her pelvis is out of place and it is causing her knees to hurt, but states her R heel isn't hurting most of the time (states it still gets fatigued and throbs some when she works long hours)  Pain: Initial:   0/10 Post Session:  No pain reported   Medications Last Reviewed:  2019  Updated Objective Findings: None Today   TREATMENT:   THERAPEUTIC EXERCISE: (39 minutes):  Exercises per grid below to improve mobility, strength, balance and endurance. Required minimal verbal cues to promote proper body alignment and promote proper body posture.      Date:  7-15-19 Date:  2019 Date:  2019 Date:  2019 Date:  2019   Activity/Exercise Parameters Parameters Parameters     Physiostep  10 minutes  Level 1 10 minutes  Level 2 12 minutes  Level 2 13 minutes  Level 2 13 minutes at L3 for increased strength/endurance   Standing on Blue foam  1 min  Feet apart x 60 seconds with minimal to no ankle instability  Feet together x 60 seconds with minimal ankle instability  Feet in semi-tandem x 30-60 seconds with minimal to moderate ankle instability  *shoes off Feet together x 60 seconds with minimal to no ankle instability  Feet in semi-tandem x 30-60 seconds with minimal to moderate ankle instability  *shoes off Feet together x 60 seconds with minimal to no ankle instability  Feet in semi-tandem x 30-60 seconds with minimal to moderate ankle instability  *shoes off    Standing on blue foam - with trunk rotation X 10  10 reps/1 set each direction with cues to increase motion to increase challenge; performed with shoes off; minimal to moderate ankle instability      Standing on blue foam - with flexion /extension X 10  10 reps/1 set each direction with minimal ankle instability      Incline board 3 x 30 sec B 30 second hold x 3 reps/1 set with knees extended then flexed 30 second hold x 3 reps/1 set with knees extended then flexed 30 second hold x 3 reps/1 set with knees extended then flexed 30 second hold x 3 reps/1 set with knees extended then flexed   [employer] board  A/P & M/L   Tapping x 20 then static hold 1 min each Tapping anteriorly/posteriorly and medially/laterally x 20 reps/1 set each direction  Holding 1 minute in the middle each direction      Ankle DF/PF Yellow x 20 R each direction  Yellow x 20 R each direction      Ankle Inversion/Eversion Yellow x 20 R each direction  Yellow x 20 R each direction      Heel/toe raises in standing on blue foam   20 reps/1 set with no UE support and cues for increased motion  *no shoes 20 reps/1 set with feet together with no UE support and cues for increased motion  *no shoes    Standing LE marching on foam   20 reps/1 set with cues for increased control with minimal to no UE support  *no shoes 20 reps/1 set with cues for increased control with minimal to no UE support  *no shoes    Leg press machine   25 lb resistance; 20 reps/1 set with cues for slower movement and slightly increased knee/hip flexion with each rep 30 lb resistance; 20 reps/1 set with cues for slower movement and slightly increased knee/hip flexion with each rep 35 lb resistance; 20 reps/1 set with cues for slower movement and slightly increased knee/hip flexion with each rep   Standing lunges   3-4 reps/1 set each LE forward with cues to avoid movement forward      Standing hip abduction   10 reps/1 set each LE with cues for slower movement and minimal to no UE support     Lower trunk rotation     5 reps/1 set each direction with 5-10 second hold - pt states she is doing this at home   Bridging in hooklying     3 reps/3 sets to assess leg length; 20 reps/1 set with B LEs elevated on exercise ball to increase challenge to core   Muscle energy technique at pelvis     With pt in hooklying position - resisted R hip flexion and L hip extension and vice versa x 3 reps, followed by resisted hip abduction/adduction x 3 reps, and bridging x 3 reps x 2 sets to improve pelvic symmetry and reduce muscular tightness and pain   Sidelying clamshells     20 reps/1 set each LE with cues for slow controlled movement and avoiding posterior lumbar rotation    Anterior/posterior tapping on rocker board     With feet together; 20 reps/1 set with minimal progressing to no UE support               MANUAL THERAPY: ( minutes):  Modalities ()    Treatment/Session Summary:    · Response to Treatment:  Pt demonstrating pelvic and LE length asymmetry (L LE longer) this session that improved with MET. Focused this session on teaching pt stabilizing exercises for her pelvis to reduce strain at her R foot/ankle. Potential discharge next week pending pt progress        No adverse reactions/unusual changes observed/reported in clinical status this session. · Communication/Consultation:  None today  · Equipment provided today:  None today  · Recommendations/Intent for next treatment session: Next visit will focus on manual therapy/modalities to reduce muscular tightness and pain; ankle strengthening/stretching exercises; physiostep/nustep okay.     Total Treatment Billable Duration:  39 minutes  PT Patient Time In/Time Out  Time In: 0804  Time Out: 8987  Nneka Landis DPT    Future Appointments   Date Time Provider Sarmad Thakur   8/15/2019  1:00 PM Annika Johsi DPT [facility]   12/3/2019  8:20 AM PFP LAB [facility] PFP   12/11/2019  9:00 AM Layton ONEILL PA-C [facility] PFP PFP

## 2019-08-15 ENCOUNTER — HOSPITAL ENCOUNTER (OUTPATIENT)
Dept: PHYSICAL THERAPY | Age: 54
Discharge: HOME | End: 2019-08-15
Payer: COMMERCIAL

## 2019-08-15 PROCEDURE — 97110 THERAPEUTIC EXERCISES: CPT

## 2019-08-15 NOTE — PROGRESS NOTES
Pushpa Denisse  : 1965  Primary: Babatunde Acosta  Secondary:  [address]  at [facility]  [address], [address], [place], [facility] [address] [facility]  [phone]   [phone]       OUTPATIENT PHYSICAL THERAPY: Daily Treatment Note 8/15/2019  Visit Count:  7  ICD-10: Treatment Diagnosis: Pain in right ankle and joints of right foot (M25.571)    Difficulty in walking, not elsewhere classified (R26.2)  Precautions/Allergies:   Metformin and Other plant, animal, environmental   TREATMENT PLAN:  Effective Dates/Frequency/Duration: Twice per week from 2019 until 2019 (6 weeks). Pre-treatment Symptoms/Complaints:  Pt states her knees have been hurting really badly - states her back was hurting after her last session, and now it has moved to her knees; states she has only had slight twinge of pain in her R foot/ankle one day this past week  Pain: Initial:   2/10 in R knee Post Session:  Pt stating slight increased pain in L knee, but no more pain in R knee   Medications Last Reviewed:  8/15/2019  Updated Objective Findings: None Today   TREATMENT:   THERAPEUTIC EXERCISE: (38 minutes):  Exercises per grid below to improve mobility, strength, balance and endurance. Required minimal verbal cues to promote proper body alignment and promote proper body posture.      Date:  2019 Date:  2019 Date:  2019 Date:  2019 Date:  8/15/2019   Activity/Exercise Parameters Parameters      Physiostep  10 minutes  Level 2 12 minutes  Level 2 13 minutes  Level 2 13 minutes at L3 for increased strength/endurance 12 minutes at L2 for increased strength/endurance   Standing on Blue foam  Feet apart x 60 seconds with minimal to no ankle instability  Feet together x 60 seconds with minimal ankle instability  Feet in semi-tandem x 30-60 seconds with minimal to moderate ankle instability  *shoes off Feet together x 60 seconds with minimal to no ankle instability  Feet in semi-tandem x 30-60 seconds with minimal to moderate ankle instability  *shoes off Feet together x 60 seconds with minimal to no ankle instability  Feet in semi-tandem x 30-60 seconds with minimal to moderate ankle instability  *shoes off     Standing on blue foam - with trunk rotation 10 reps/1 set each direction with cues to increase motion to increase challenge; performed with shoes off; minimal to moderate ankle instability       Standing on blue foam - with flexion /extension 10 reps/1 set each direction with minimal ankle instability       Incline board 30 second hold x 3 reps/1 set with knees extended then flexed 30 second hold x 3 reps/1 set with knees extended then flexed 30 second hold x 3 reps/1 set with knees extended then flexed 30 second hold x 3 reps/1 set with knees extended then flexed 30 second hold x 3 reps/1 set with knees extended then flexed   Rocker board  Tapping anteriorly/posteriorly and medially/laterally x 20 reps/1 set each direction  Holding 1 minute in the middle each direction       Ankle DF/PF Yellow x 20 R each direction       Ankle Inversion/Eversion Yellow x 20 R each direction       Heel/toe raises in standing on blue foam  20 reps/1 set with no UE support and cues for increased motion  *no shoes 20 reps/1 set with feet together with no UE support and cues for increased motion  *no shoes     Standing LE marching on foam  20 reps/1 set with cues for increased control with minimal to no UE support  *no shoes 20 reps/1 set with cues for increased control with minimal to no UE support  *no shoes     Leg press machine  25 lb resistance; 20 reps/1 set with cues for slower movement and slightly increased knee/hip flexion with each rep 30 lb resistance; 20 reps/1 set with cues for slower movement and slightly increased knee/hip flexion with each rep 35 lb resistance; 20 reps/1 set with cues for slower movement and slightly increased knee/hip flexion with each rep 40 lb resistance; 20 reps/1 set with cues for slower movement and slightly increased knee/hip flexion with each rep   Standing lunges  3-4 reps/1 set each LE forward with cues to avoid movement forward       Standing hip abduction  10 reps/1 set each LE with cues for slower movement and minimal to no UE support      Lower trunk rotation    5 reps/1 set each direction with 5-10 second hold - pt states she is doing this at home    Bridging in hooklying    3 reps/3 sets to assess leg length; 20 reps/1 set with B LEs elevated on exercise ball to increase challenge to core 3 reps/3 sets to assess leg length; 20 reps/1 set with B LEs elevated on exercise ball to increase challenge to core   Muscle energy technique at pelvis    With pt in hooklying position - resisted R hip flexion and L hip extension and vice versa x 3 reps, followed by resisted hip abduction/adduction x 3 reps, and bridging x 3 reps x 2 sets to improve pelvic symmetry and reduce muscular tightness and pain With pt in hooklying position - resisted R hip flexion and L hip extension and vice versa x 3 reps, followed by resisted hip abduction/adduction x 3 reps, and bridging x 3 reps x 2 sets to improve pelvic symmetry and reduce muscular tightness and pain   Sidelying clamshells    20 reps/1 set each LE with cues for slow controlled movement and avoiding posterior lumbar rotation  Progressed to sidelying hip abduction; 20 reps/1 set each LE with cues for slow controlled movement throughout   Anterior/posterior tapping on rocker board    With feet together; 20 reps/1 set with minimal progressing to no UE support With feet together; 20 reps/1 set with minimal progressing to no UE support; also practiced maintaining balance in middle of board x 30-60 second trials   Prone hip extension      10 reps/2 sets each LE with cues for slow controlled movement throughout       MANUAL THERAPY: ( minutes):  Modalities ()    Treatment/Session Summary:    · Response to Treatment:  Pt demonstrating slight pelvic and LE length asymmetry (L LE longer) this session that improved with MET. Focused again this session on teaching pt stabilizing exercises for her pelvis to reduce strain at her R foot/ankle. Discussed with pt that we are working on pelvic alignment to address the ankle/foot pain, so if foot/ankle pain is no longer an issue we need to discharge - instructed pt that if she continues to have back pain following discharge from therapy, she can discuss getting a new referral for back pain with her MD. Potential discharge next week pending pt progress        No adverse reactions/unusual changes observed/reported in clinical status this session. · Communication/Consultation:  None today  · Equipment provided today:  None today  · Recommendations/Intent for next treatment session: Next visit will focus on manual therapy/modalities to reduce muscular tightness and pain; ankle strengthening/stretching exercises; physiostep/nustep okay.     Total Treatment Billable Duration:  38 minutes  PT Patient Time In/Time Out  Time In: 5836  Time Out: JALIL Chou DPT    Future Appointments   Date Time Provider Sarmad Thakur   8/23/2019  9:30 AM Luis Sever, DPT [facility]   12/3/2019  8:20 AM PFP LAB [facility] PFP   12/11/2019  9:00 AM Ignacio Quinonez PA-C [facility] PFP PFP

## 2019-11-08 NOTE — THERAPY DISCHARGE
Noble Reyes  : 1965  Primary: Yuko Acosta  Secondary:  [address]  at [address], [address] [facility], [place] [address] [address] [facility]  [phone]   [phone]        OUTPATIENT PHYSICAL THERAPY:Discontinuation Summary 2019   ICD-10: Treatment Diagnosis: Pain in right ankle and joints of right foot (M25.571)    Difficulty in walking, not elsewhere classified (R26.2)  Precautions/Allergies:   Metformin and Other plant, animal, environmental   TREATMENT PLAN:  Effective Dates/Frequency/Duration: Twice per week from 2019 until 2019 (6 weeks). MEDICAL/REFERRING DIAGNOSIS:  Achilles tendon pain [M76.60]   DATE OF ONSET: 2018  REFERRING PHYSICIAN: Chitra Ashford MD Orders: Evaluate and treat  Return MD Appointment: unspecified     Noble Reyes has been seen in physical therapy from 2019 to 8/15/2019 for 7 visits. Treatment has been discontinued at this time due to patient noncompliant with attending/scheduling PT sessions and expiration of plan of care without further referral by ordering physician. The below goals were not reassessed secondary to pt failing to attend additional sessions prior to discharge. Thank you for this referral.    PROBLEM LIST (Impacting functional limitations):  1. Decreased Strength  2. Decreased ADL/Functional Activities  3. Decreased Ambulation Ability/Technique  4. Increased Pain  5. Decreased Activity Tolerance  6. Decreased Work Simplification/Energy Conservation Techniques  7. Increased Fatigue  8. Decreased Flexibility/Joint Mobility  9. Edema/Girth INTERVENTIONS PLANNED: (Treatment may consist of any combination of the following)  1. Balance Exercise  2. Bed Mobility  3. Cold  4. Electrical Stimulation  5. Family Education  6. Gait Training  7. Heat  8. Home Exercise Program (HEP)  9. Manual Therapy  10. Neuromuscular Re-education/Strengthening  11. Range of Motion (ROM)  12.  Therapeutic Activites  13. Therapeutic Exercise/Strengthening  14. Transcutaneous Electrical Nerve Stimulation (TENS)  15. Transfer Training  16. Ultrasound (US)     GOALS: (Goals have been discussed and agreed upon with patient.)  Discharge Goals: Time Frame: 6 weeks  1. Pt will be independent with HEP in order to increase LE strength/endurance/mobility to improve functional mobility and overall quality of life. 2. Pt will improve score on the Foot and Ankle Ability Measure to 84/84 in order to demonstrate improved functional mobility and quality of life. 3. Pt will increase bilateral ankle to 15 degrees with minimal to no increase in pain in order to improve functional mobility and reduce gait deficits. 4. Pt will report being able to drive for 5 consecutive days with no reports of increased pain in order to demonstrate improved quality of life. OUTCOME MEASURE:   Tool Used: PT/OT FOOT AND ANKLE ABILITY MEASURE  Score:  Initial: 79/84 Most Recent: X (Date: -- )   Interpretation of Score: For the \"Activities of Daily Living\", there are 21 questions each scored on a 5 point scale with 0 representing \"Unable to do\" and 4 representing \"No difficulty\". The lower the score, the greater the functional disability. 84/84 represents no disability. Minimal detectable change is 5.7 points. With the addition of the 8 questions in the \"Sports Subscale,\" there are 29 questions, each scored on a 5 point scale with 0 representing \"Unable to do\" and 4 representing \"No difficulty\". The lower the score, the greater the functional disability. 116/116 represents no disability. Minimal detectable change is 12.3 points.     UPDATED OBJECTIVE FINDINGS:     Initial assessment only today: see objective section below for details    FALL RISK:    Ambulatory/Rehab Services H2 Model Falls Risk Assessment   Risk Factors:       No Risk Factors Identified Ability to Rise from Chair:       (0)  Ability to rise in a single movement   Falls Prevention Plan:       No modifications necessary   Total: (5 or greater = High Risk): 0   ©2010 [facility] . [facility] States Patent #6812,421. Federal Law prohibits the replication, distribution or use without written permission from [facility] [employer] you for this referral,  Kip Valentino DPT     Referring Physician Signature: Lexi Jackson PA-C No Signature is Required for this note.

## 2020-01-30 ENCOUNTER — APPOINTMENT (RX ONLY)
Dept: URBAN - METROPOLITAN AREA CLINIC 349 | Facility: CLINIC | Age: 55
Setting detail: DERMATOLOGY
End: 2020-01-30

## 2020-01-30 DIAGNOSIS — H61.03 CHONDRITIS OF EXTERNAL EAR: ICD-10-CM

## 2020-01-30 DIAGNOSIS — L91.8 OTHER HYPERTROPHIC DISORDERS OF THE SKIN: ICD-10-CM

## 2020-01-30 DIAGNOSIS — Z80.8 FAMILY HISTORY OF MALIGNANT NEOPLASM OF OTHER ORGANS OR SYSTEMS: ICD-10-CM

## 2020-01-30 DIAGNOSIS — D22 MELANOCYTIC NEVI: ICD-10-CM

## 2020-01-30 DIAGNOSIS — Z71.89 OTHER SPECIFIED COUNSELING: ICD-10-CM

## 2020-01-30 PROBLEM — D22.5 MELANOCYTIC NEVI OF TRUNK: Status: ACTIVE | Noted: 2020-01-30

## 2020-01-30 PROBLEM — H61.033 CHONDRITIS OF EXTERNAL EAR, BILATERAL: Status: ACTIVE | Noted: 2020-01-30

## 2020-01-30 PROCEDURE — ? COUNSELING

## 2020-01-30 PROCEDURE — 99243 OFF/OP CNSLTJ NEW/EST LOW 30: CPT

## 2020-01-30 PROCEDURE — ? SKIN TAG REMOVAL (COSMETIC)

## 2020-01-30 PROCEDURE — ? EDUCATIONAL RESOURCES PROVIDED

## 2020-01-30 PROCEDURE — ? BODY PHOTOGRAPHY

## 2020-01-30 PROCEDURE — ? OTHER

## 2020-01-30 PROCEDURE — ? RETURN TO REFERRING PROVIDER

## 2020-01-30 ASSESSMENT — LOCATION SIMPLE DESCRIPTION DERM
LOCATION SIMPLE: RIGHT ANTERIOR NECK
LOCATION SIMPLE: LEFT UPPER BACK
LOCATION SIMPLE: RIGHT EAR
LOCATION SIMPLE: UPPER BACK
LOCATION SIMPLE: LEFT ANTERIOR NECK
LOCATION SIMPLE: LEFT EAR

## 2020-01-30 ASSESSMENT — LOCATION DETAILED DESCRIPTION DERM
LOCATION DETAILED: LEFT MEDIAL UPPER BACK
LOCATION DETAILED: RIGHT SCAPHA
LOCATION DETAILED: LEFT SUPERIOR HELIX
LOCATION DETAILED: LEFT INFERIOR MEDIAL UPPER BACK
LOCATION DETAILED: INFERIOR THORACIC SPINE
LOCATION DETAILED: RIGHT INFERIOR LATERAL NECK
LOCATION DETAILED: LEFT INFERIOR LATERAL NECK

## 2020-01-30 ASSESSMENT — LOCATION ZONE DERM
LOCATION ZONE: EAR
LOCATION ZONE: NECK
LOCATION ZONE: TRUNK

## 2020-01-30 NOTE — PROCEDURE: OTHER
Other (Free Text): Provided patient with [facility] pillow brochure.\\n\\nPatient recently got a CPAP, patient is unsure if this goes across this area Other (Free Text): Provided patient with [facility] pillow brochure.\\n\\nPatient recently got a CPAP, patient is unsure if this goes across this area

## 2020-01-30 NOTE — PROCEDURE: SKIN TAG REMOVAL (COSMETIC)
Removed With: gradle excision
Price (Use Numbers Only, No Special Characters Or $): 40
Anesthesia Volume In Cc: 0
Consent: Written consent obtained and the risks of skin tag removal was reviewed with the patient including but not limited to bleeding, pigmentary change, infection, pain, and remote possibility of scarring.
Detail Level: Detailed

## 2020-07-30 ENCOUNTER — APPOINTMENT (RX ONLY)
Dept: URBAN - METROPOLITAN AREA CLINIC 349 | Facility: CLINIC | Age: 55
Setting detail: DERMATOLOGY
End: 2020-07-30

## 2020-07-30 DIAGNOSIS — Z80.8 FAMILY HISTORY OF MALIGNANT NEOPLASM OF OTHER ORGANS OR SYSTEMS: ICD-10-CM

## 2020-07-30 DIAGNOSIS — L91.8 OTHER HYPERTROPHIC DISORDERS OF THE SKIN: ICD-10-CM

## 2020-07-30 DIAGNOSIS — D22 MELANOCYTIC NEVI: ICD-10-CM | Status: STABLE

## 2020-07-30 DIAGNOSIS — L82.1 OTHER SEBORRHEIC KERATOSIS: ICD-10-CM

## 2020-07-30 PROBLEM — D22.5 MELANOCYTIC NEVI OF TRUNK: Status: ACTIVE | Noted: 2020-07-30

## 2020-07-30 PROCEDURE — ? SKIN TAG REMOVAL (COSMETIC)

## 2020-07-30 PROCEDURE — 99213 OFFICE O/P EST LOW 20 MIN: CPT

## 2020-07-30 PROCEDURE — ? COUNSELING

## 2020-07-30 PROCEDURE — ? BODY PHOTOGRAPHY

## 2020-07-30 PROCEDURE — ? OTHER

## 2020-07-30 ASSESSMENT — LOCATION SIMPLE DESCRIPTION DERM
LOCATION SIMPLE: LEFT SHOULDER
LOCATION SIMPLE: CHEST
LOCATION SIMPLE: LEFT ANTERIOR AXILLA
LOCATION SIMPLE: RIGHT CLAVICULAR SKIN
LOCATION SIMPLE: RIGHT ANTERIOR NECK
LOCATION SIMPLE: RIGHT AXILLARY VAULT
LOCATION SIMPLE: LEFT UPPER BACK
LOCATION SIMPLE: RIGHT CALF

## 2020-07-30 ASSESSMENT — LOCATION ZONE DERM
LOCATION ZONE: TRUNK
LOCATION ZONE: LEG
LOCATION ZONE: AXILLAE
LOCATION ZONE: NECK
LOCATION ZONE: ARM

## 2020-07-30 ASSESSMENT — LOCATION DETAILED DESCRIPTION DERM
LOCATION DETAILED: RIGHT CLAVICULAR NECK
LOCATION DETAILED: LEFT MEDIAL UPPER BACK
LOCATION DETAILED: LEFT ANTERIOR SHOULDER
LOCATION DETAILED: RIGHT AXILLARY VAULT
LOCATION DETAILED: LEFT ANTERIOR AXILLA
LOCATION DETAILED: STERNAL NOTCH
LOCATION DETAILED: RIGHT INFERIOR LATERAL NECK
LOCATION DETAILED: RIGHT CLAVICULAR SKIN
LOCATION DETAILED: RIGHT DISTAL CALF
LOCATION DETAILED: RIGHT INFERIOR ANTERIOR NECK
LOCATION DETAILED: LEFT INFERIOR MEDIAL UPPER BACK

## 2020-07-30 NOTE — PROCEDURE: SKIN TAG REMOVAL (COSMETIC)
Consent: Written consent obtained and the risks of skin tag removal was reviewed with the patient including but not limited to bleeding, pigmentary change, infection, pain, and remote possibility of scarring.
Detail Level: Detailed
Price (Use Numbers Only, No Special Characters Or $): 100
Removed With: gradle excision
Anesthesia Volume In Cc: 0

## 2020-07-30 NOTE — PROCEDURE: BODY PHOTOGRAPHY
Detail Level: Generalized
Consent: Written consent obtained, risks reviewed for whole body photography. Patient understands that photograph costs may not be covered by insurance, and patient is ultimately responsible for payment.
Was The Entire Body Photographed (Cannot Bill Unless Entire Body Photographed)?: No
Number Of Photographs (Optional- Will Not Render If 0): 2
Reason For Photography: The patient is obtaining body photography to observe existing suspicious moles and or monitor for the appearance of any new lesions.
Whole Body Statement: The whole body was photographed today.

## 2020-07-30 NOTE — PROCEDURE: OTHER
Note Text (......Xxx Chief Complaint.): This diagnosis correlates with the
Other (Free Text): Maternal grandmother
Detail Level: Detailed

## 2021-11-08 PROBLEM — Z83.438 FAMILY HISTORY OF HYPERLIPIDEMIA: Status: ACTIVE | Noted: 2021-11-08

## 2021-11-08 PROBLEM — Z82.41 FAMILY HISTORY OF SUDDEN CARDIAC DEATH IN BROTHER: Status: ACTIVE | Noted: 2021-11-08

## 2022-01-13 ENCOUNTER — HOSPITAL ENCOUNTER (OUTPATIENT)
Dept: CT IMAGING | Age: 57
Discharge: HOME | End: 2022-01-13
Attending: INTERNAL MEDICINE
Payer: SELF-PAY

## 2022-01-13 DIAGNOSIS — E11.9 TYPE 2 DIABETES MELLITUS WITHOUT COMPLICATION, WITH LONG-TERM CURRENT USE OF INSULIN (HCC): ICD-10-CM

## 2022-01-13 DIAGNOSIS — Z82.49 FAMILY HISTORY OF ISCHEMIC HEART DISEASE: ICD-10-CM

## 2022-01-13 DIAGNOSIS — E11.69 HYPERLIPIDEMIA ASSOCIATED WITH TYPE 2 DIABETES MELLITUS (HCC): ICD-10-CM

## 2022-01-13 DIAGNOSIS — Z79.4 TYPE 2 DIABETES MELLITUS WITHOUT COMPLICATION, WITH LONG-TERM CURRENT USE OF INSULIN (HCC): ICD-10-CM

## 2022-01-13 DIAGNOSIS — I10 ESSENTIAL HYPERTENSION WITH GOAL BLOOD PRESSURE LESS THAN 130/80: ICD-10-CM

## 2022-01-13 DIAGNOSIS — E78.5 HYPERLIPIDEMIA ASSOCIATED WITH TYPE 2 DIABETES MELLITUS (HCC): ICD-10-CM

## 2022-01-13 PROCEDURE — 75571 CT HRT W/O DYE W/CA TEST: CPT

## 2022-03-18 PROBLEM — Z83.438 FAMILY HISTORY OF HYPERLIPIDEMIA: Status: ACTIVE | Noted: 2021-11-08

## 2022-03-19 PROBLEM — N89.3 VAGINAL DYSPLASIA: Status: ACTIVE | Noted: 2017-08-18

## 2022-03-19 PROBLEM — E66.01 SEVERE OBESITY (HCC): Status: ACTIVE | Noted: 2019-03-08

## 2022-03-19 PROBLEM — Z85.3 HISTORY OF LEFT BREAST CANCER: Status: ACTIVE | Noted: 2019-05-23

## 2022-03-19 PROBLEM — F33.2 SEVERE EPISODE OF RECURRENT MAJOR DEPRESSIVE DISORDER, WITHOUT PSYCHOTIC FEATURES (HCC): Status: ACTIVE | Noted: 2019-05-23

## 2022-03-19 PROBLEM — F41.9 ANXIETY: Status: ACTIVE | Noted: 2018-06-05

## 2022-03-19 PROBLEM — Z82.41 FAMILY HISTORY OF SUDDEN CARDIAC DEATH IN BROTHER: Status: ACTIVE | Noted: 2021-11-08

## 2022-03-20 PROBLEM — G47.33 OSA (OBSTRUCTIVE SLEEP APNEA): Status: ACTIVE | Noted: 2018-10-25

## 2022-03-20 PROBLEM — Z79.4 LONG-TERM INSULIN USE (HCC): Status: ACTIVE | Noted: 2019-05-23

## 2022-03-29 NOTE — PROGRESS NOTES
Sherrie Gamboa  : 1965  Primary: Connor Acosta  Secondary:  [address]  at [facility]  [address], [address], [facility], [address] [facility]  [phone]   [phone]       OUTPATIENT PHYSICAL THERAPY: Daily Treatment Note 2019  Visit Count:  4  ICD-10: Treatment Diagnosis: Pain in right ankle and joints of right foot (M25.571)    Difficulty in walking, not elsewhere classified (R26.2)  Precautions/Allergies:   Metformin and Other plant, animal, environmental   TREATMENT PLAN:  Effective Dates/Frequency/Duration: Twice per week from 2019 until 2019 (6 weeks). Pre-treatment Symptoms/Complaints:  Pt states she has been doing good but she still feels stiffness in the morning and at night; pt states she went to the gym on  and did mostly arm exercises but did do one calf exercise  Pain: Initial:   0/10 Post Session:  No pain reported   Medications Last Reviewed:  2019  Updated Objective Findings: None Today   TREATMENT:   THERAPEUTIC EXERCISE: (38 minutes):  Exercises per grid below to improve mobility, strength, balance and endurance. Required minimal verbal cues to promote proper body alignment and promote proper body posture.      Date:  7-15-19 Date:  2019 Date:  2019   Activity/Exercise Parameters Parameters Parameters   Physiostep  10 minutes  Level 1 10 minutes  Level 2 12 minutes  Level 2   Standing on Blue foam  1 min  Feet apart x 60 seconds with minimal to no ankle instability  Feet together x 60 seconds with minimal ankle instability  Feet in semi-tandem x 30-60 seconds with minimal to moderate ankle instability  *shoes off Feet together x 60 seconds with minimal to no ankle instability  Feet in semi-tandem x 30-60 seconds with minimal to moderate ankle instability  *shoes off   Standing on blue foam - with trunk rotation X 10  10 reps/1 set each direction with cues to increase motion to increase challenge; performed with shoes off; minimal to moderate ankle instability    Standing on blue foam - with flexion /extension X 10  10 reps/1 set each direction with minimal ankle instability    Incline board 3 x 30 sec B 30 second hold x 3 reps/1 set with knees extended then flexed 30 second hold x 3 reps/1 set with knees extended then flexed   Rocker board  A/P & M/L   Tapping x 20 then static hold 1 min each Tapping anteriorly/posteriorly and medially/laterally x 20 reps/1 set each direction  Holding 1 minute in the middle each direction    Ankle DF/PF Yellow x 20 R each direction  Yellow x 20 R each direction    Ankle Inversion/Eversion Yellow x 20 R each direction  Yellow x 20 R each direction    Heel/toe raises in standing on blue foam   20 reps/1 set with no UE support and cues for increased motion  *no shoes   Standing LE marching on foam   20 reps/1 set with cues for increased control with minimal to no UE support  *no shoes   Leg press machine   25 lb resistance; 20 reps/1 set with cues for slower movement and slightly increased knee/hip flexion with each rep   Standing lunges   3-4 reps/1 set each LE forward with cues to avoid movement forward    Standing hip abduction   10 reps/1 set each LE with cues for slower movement and minimal to no UE support                   MANUAL THERAPY: ( minutes): Joint mobilization and Soft tissue mobilization was utilized and necessary because of the patient's restricted joint motion, painful spasm, loss of articular motion and restricted motion of soft tissue. With pt in supported supine position (B LEs elevated on wedge), STM to connective tissue at plantar surface of R foot and cross friction massage along R achilles tendon to reduce muscular tightness and pain.     Modalities ()    Treatment/Session Summary:    · Response to Treatment:  Pt able to perform more challenging exercises this session with no reports of increased pain, but continues to be very talkative, requiring increased cues for correct technique. Will continue to progress exercises as tolerated by pt. No adverse reactions/unusual changes observed/reported in clinical status this session. · Communication/Consultation:  None today  · Equipment provided today:  None today  · Recommendations/Intent for next treatment session: Next visit will focus on manual therapy/modalities to reduce muscular tightness and pain; ankle strengthening/stretching exercises; physiostep/nustep okay.     Total Treatment Billable Duration:  38 minutes  PT Patient Time In/Time Out  Time In: 3630  Time Out: 25 [facility] Alli, IRINA    Future Appointments   Date Time Provider Sarmad Thakur   7/30/2019  1:45 PM Yolette Leblanc DPT [facility]   8/7/2019 10:15 AM Ingrid ARMSTRONG DPT [facility]   12/3/2019  8:20 AM [facility]   12/11/2019  9:00 AM Qasim Ortiz PA-C [facility] patient

## 2022-04-07 ENCOUNTER — APPOINTMENT (RX ONLY)
Dept: URBAN - METROPOLITAN AREA CLINIC 329 | Facility: CLINIC | Age: 57
Setting detail: DERMATOLOGY
End: 2022-04-07

## 2022-04-07 DIAGNOSIS — Z80.8 FAMILY HISTORY OF MALIGNANT NEOPLASM OF OTHER ORGANS OR SYSTEMS: ICD-10-CM

## 2022-04-07 DIAGNOSIS — L82.0 INFLAMED SEBORRHEIC KERATOSIS: ICD-10-CM

## 2022-04-07 DIAGNOSIS — D22 MELANOCYTIC NEVI: ICD-10-CM | Status: STABLE

## 2022-04-07 DIAGNOSIS — L57.8 OTHER SKIN CHANGES DUE TO CHRONIC EXPOSURE TO NONIONIZING RADIATION: ICD-10-CM

## 2022-04-07 PROBLEM — D22.5 MELANOCYTIC NEVI OF TRUNK: Status: ACTIVE | Noted: 2022-04-07

## 2022-04-07 PROCEDURE — ? LIQUID NITROGEN

## 2022-04-07 PROCEDURE — ? BODY PHOTOGRAPHY

## 2022-04-07 PROCEDURE — ? COUNSELING

## 2022-04-07 PROCEDURE — 99213 OFFICE O/P EST LOW 20 MIN: CPT | Mod: 25

## 2022-04-07 PROCEDURE — 17110 DESTRUCTION B9 LES UP TO 14: CPT

## 2022-04-07 PROCEDURE — ? OTHER

## 2022-04-07 PROCEDURE — ? FULL BODY SKIN EXAM

## 2022-04-07 ASSESSMENT — LOCATION DETAILED DESCRIPTION DERM
LOCATION DETAILED: LEFT INFERIOR MEDIAL UPPER BACK
LOCATION DETAILED: LEFT INFERIOR LATERAL NECK
LOCATION DETAILED: LEFT CLAVICULAR NECK
LOCATION DETAILED: LEFT DORSAL INDEX FINGER METACARPOPHALANGEAL JOINT
LOCATION DETAILED: LEFT MEDIAL SUPERIOR CHEST
LOCATION DETAILED: LEFT CLAVICULAR SKIN
LOCATION DETAILED: LEFT MEDIAL UPPER BACK
LOCATION DETAILED: LEFT LATERAL SUPERIOR CHEST
LOCATION DETAILED: INFERIOR THORACIC SPINE

## 2022-04-07 ASSESSMENT — LOCATION ZONE DERM
LOCATION ZONE: NECK
LOCATION ZONE: TRUNK
LOCATION ZONE: HAND

## 2022-04-07 ASSESSMENT — LOCATION SIMPLE DESCRIPTION DERM
LOCATION SIMPLE: UPPER BACK
LOCATION SIMPLE: CHEST
LOCATION SIMPLE: LEFT HAND
LOCATION SIMPLE: LEFT CLAVICULAR SKIN
LOCATION SIMPLE: LEFT UPPER BACK
LOCATION SIMPLE: LEFT ANTERIOR NECK

## 2022-04-07 NOTE — PROCEDURE: LIQUID NITROGEN
Show Applicator Variable?: Yes
Medical Necessity Information: It is in your best interest to select a reason for this procedure from the list below. All of these items fulfill various CMS LCD requirements except the new and changing color options.
Medical Necessity Clause: This procedure was medically necessary because the lesions that were treated were:
Detail Level: Detailed
Add 52 Modifier (Optional): no
Spray Paint Text: The liquid nitrogen was applied to the skin utilizing a spray paint frosting technique.
Post-Care Instructions: I reviewed with the patient in detail post-care instructions. Patient is to wear sunprotection, and avoid picking at any of the treated lesions. Pt may apply Vaseline to crusted or scabbing areas.
Consent: The patient's consent was obtained including but not limited to risks of crusting, scabbing, blistering, scarring, darker or lighter pigmentary change, recurrence, incomplete removal and infection.

## 2022-04-07 NOTE — PROCEDURE: OTHER
Note Text (......Xxx Chief Complaint.): This diagnosis correlates with the
Other (Free Text): Maternal grandmother
Detail Level: Detailed
Render Risk Assessment In Note?: yes
Other (Free Text): Patient consent was obtained to proceed with the visit and recommended plan of care after discussion of all risks and benefits, including the risks of COVID-19 exposure.
Detail Level: Generalized

## 2022-05-11 DIAGNOSIS — E11.9 TYPE 2 DIABETES MELLITUS WITHOUT COMPLICATION, WITH LONG-TERM CURRENT USE OF INSULIN (HCC): Primary | ICD-10-CM

## 2022-05-11 DIAGNOSIS — E11.69 HYPERLIPIDEMIA ASSOCIATED WITH TYPE 2 DIABETES MELLITUS (HCC): ICD-10-CM

## 2022-05-11 DIAGNOSIS — E78.5 HYPERLIPIDEMIA ASSOCIATED WITH TYPE 2 DIABETES MELLITUS (HCC): ICD-10-CM

## 2022-05-11 DIAGNOSIS — E03.9 ACQUIRED HYPOTHYROIDISM: ICD-10-CM

## 2022-05-11 DIAGNOSIS — Z79.4 TYPE 2 DIABETES MELLITUS WITHOUT COMPLICATION, WITH LONG-TERM CURRENT USE OF INSULIN (HCC): Primary | ICD-10-CM

## 2022-05-11 DIAGNOSIS — I10 ESSENTIAL HYPERTENSION WITH GOAL BLOOD PRESSURE LESS THAN 130/80: ICD-10-CM

## 2022-05-24 ENCOUNTER — TELEPHONE (OUTPATIENT)
Dept: FAMILY MEDICINE CLINIC | Facility: CLINIC | Age: 57
End: 2022-05-24

## 2022-05-24 DIAGNOSIS — I10 ESSENTIAL HYPERTENSION WITH GOAL BLOOD PRESSURE LESS THAN 130/80: Primary | ICD-10-CM

## 2022-05-24 DIAGNOSIS — K21.9 GASTROESOPHAGEAL REFLUX DISEASE WITHOUT ESOPHAGITIS: ICD-10-CM

## 2022-05-24 DIAGNOSIS — E78.5 HYPERLIPIDEMIA ASSOCIATED WITH TYPE 2 DIABETES MELLITUS (HCC): ICD-10-CM

## 2022-05-24 DIAGNOSIS — E11.69 HYPERLIPIDEMIA ASSOCIATED WITH TYPE 2 DIABETES MELLITUS (HCC): ICD-10-CM

## 2022-05-24 RX ORDER — LISINOPRIL 10 MG/1
10 TABLET ORAL DAILY
Qty: 30 TABLET | Refills: 2 | Status: SHIPPED | OUTPATIENT
Start: 2022-05-24 | End: 2022-06-14 | Stop reason: SDUPTHER

## 2022-05-24 RX ORDER — PANTOPRAZOLE SODIUM 40 MG/1
40 TABLET, DELAYED RELEASE ORAL DAILY
Qty: 30 TABLET | Refills: 2 | Status: SHIPPED | OUTPATIENT
Start: 2022-05-24 | End: 2022-06-14 | Stop reason: SDUPTHER

## 2022-05-24 RX ORDER — ROSUVASTATIN CALCIUM 40 MG/1
40 TABLET, COATED ORAL DAILY
Qty: 30 TABLET | Refills: 2 | Status: SHIPPED | OUTPATIENT
Start: 2022-05-24 | End: 2022-06-14 | Stop reason: SDUPTHER

## 2022-05-24 NOTE — TELEPHONE ENCOUNTER
Requested Prescriptions     Pending Prescriptions Disp Refills    pantoprazole (PROTONIX) 40 MG tablet 30 tablet 2     Sig: Take 1 tablet by mouth daily TAKE ONE TABLET BY MOUTH ONCE DAILY FOR GASTROESOPHAGEAL REFLUX    rosuvastatin (CRESTOR) 40 MG tablet 30 tablet 2     Sig: Take 1 tablet by mouth daily    lisinopril (PRINIVIL;ZESTRIL) 10 MG tablet 30 tablet 2     Sig: Take 1 tablet by mouth daily

## 2022-05-24 NOTE — TELEPHONE ENCOUNTER
Refill  Pantoprazole 40mg  rosuvasatin 40mg   Lisinopril 10mg     [facility] lindseyfrancia ardon

## 2022-05-27 ENCOUNTER — NURSE ONLY (OUTPATIENT)
Dept: FAMILY MEDICINE CLINIC | Facility: CLINIC | Age: 57
End: 2022-05-27

## 2022-05-27 DIAGNOSIS — E11.69 HYPERLIPIDEMIA ASSOCIATED WITH TYPE 2 DIABETES MELLITUS (HCC): ICD-10-CM

## 2022-05-27 DIAGNOSIS — E03.9 ACQUIRED HYPOTHYROIDISM: ICD-10-CM

## 2022-05-27 DIAGNOSIS — E78.5 HYPERLIPIDEMIA ASSOCIATED WITH TYPE 2 DIABETES MELLITUS (HCC): ICD-10-CM

## 2022-05-27 DIAGNOSIS — Z79.4 TYPE 2 DIABETES MELLITUS WITHOUT COMPLICATION, WITH LONG-TERM CURRENT USE OF INSULIN (HCC): ICD-10-CM

## 2022-05-27 DIAGNOSIS — E11.9 TYPE 2 DIABETES MELLITUS WITHOUT COMPLICATION, WITH LONG-TERM CURRENT USE OF INSULIN (HCC): ICD-10-CM

## 2022-05-28 LAB
ALBUMIN SERPL-MCNC: 3.9 G/DL (ref 3.5–5)
ALBUMIN SERPL-MCNC: 3.9 G/DL (ref 3.5–5)
ALBUMIN/GLOB SERPL: 1.3 {RATIO} (ref 1.2–3.5)
ALBUMIN/GLOB SERPL: 1.4 {RATIO} (ref 1.2–3.5)
ALP SERPL-CCNC: 65 U/L (ref 50–136)
ALP SERPL-CCNC: 66 U/L (ref 50–136)
ALT SERPL-CCNC: 42 U/L (ref 12–65)
ALT SERPL-CCNC: 43 U/L (ref 12–65)
ANION GAP SERPL CALC-SCNC: 7 MMOL/L (ref 7–16)
ANION GAP SERPL CALC-SCNC: 8 MMOL/L (ref 7–16)
APPEARANCE UR: CLEAR
AST SERPL-CCNC: 24 U/L (ref 15–37)
AST SERPL-CCNC: 24 U/L (ref 15–37)
BILIRUB SERPL-MCNC: 0.5 MG/DL (ref 0.2–1.1)
BILIRUB SERPL-MCNC: 0.5 MG/DL (ref 0.2–1.1)
BILIRUB UR QL: NEGATIVE
BUN SERPL-MCNC: 18 MG/DL (ref 6–23)
BUN SERPL-MCNC: 19 MG/DL (ref 6–23)
CALCIUM SERPL-MCNC: 9.6 MG/DL (ref 8.3–10.4)
CALCIUM SERPL-MCNC: 9.6 MG/DL (ref 8.3–10.4)
CHLORIDE SERPL-SCNC: 102 MMOL/L (ref 98–107)
CHLORIDE SERPL-SCNC: 103 MMOL/L (ref 98–107)
CHOLEST SERPL-MCNC: 143 MG/DL
CHOLEST SERPL-MCNC: 147 MG/DL
CO2 SERPL-SCNC: 27 MMOL/L (ref 21–32)
CO2 SERPL-SCNC: 27 MMOL/L (ref 21–32)
COLOR UR: YELLOW
CREAT SERPL-MCNC: 0.8 MG/DL (ref 0.6–1)
CREAT SERPL-MCNC: 0.9 MG/DL (ref 0.6–1)
CREAT UR-MCNC: 105 MG/DL
EST. AVERAGE GLUCOSE BLD GHB EST-MCNC: 154 MG/DL
GLOBULIN SER CALC-MCNC: 2.8 G/DL (ref 2.3–3.5)
GLOBULIN SER CALC-MCNC: 2.9 G/DL (ref 2.3–3.5)
GLUCOSE SERPL-MCNC: 129 MG/DL (ref 65–100)
GLUCOSE SERPL-MCNC: 133 MG/DL (ref 65–100)
GLUCOSE UR STRIP.AUTO-MCNC: NEGATIVE MG/DL
HBA1C MFR BLD: 7 % (ref 4.2–6.3)
HDLC SERPL-MCNC: 46 MG/DL (ref 40–60)
HDLC SERPL-MCNC: 47 MG/DL (ref 40–60)
HDLC SERPL: 3 {RATIO}
HDLC SERPL: 3.2 {RATIO}
HGB UR QL STRIP: NEGATIVE
KETONES UR QL STRIP.AUTO: NEGATIVE MG/DL
LDLC SERPL CALC-MCNC: 62.6 MG/DL
LDLC SERPL CALC-MCNC: 67.4 MG/DL
LEUKOCYTE ESTERASE UR QL STRIP.AUTO: NEGATIVE
MICROALBUMIN UR-MCNC: 0.53 MG/DL
MICROALBUMIN/CREAT UR-RTO: 5 MG/G
NITRITE UR QL STRIP.AUTO: NEGATIVE
PH UR STRIP: 5 [PH] (ref 5–9)
POTASSIUM SERPL-SCNC: 4.3 MMOL/L (ref 3.5–5.1)
POTASSIUM SERPL-SCNC: 4.3 MMOL/L (ref 3.5–5.1)
PROT SERPL-MCNC: 6.7 G/DL (ref 6.3–8.2)
PROT SERPL-MCNC: 6.8 G/DL (ref 6.3–8.2)
PROT UR STRIP-MCNC: NEGATIVE MG/DL
SODIUM SERPL-SCNC: 137 MMOL/L (ref 136–145)
SODIUM SERPL-SCNC: 137 MMOL/L (ref 136–145)
SP GR UR REFRACTOMETRY: >1.03 (ref 1–1.02)
T4 FREE SERPL-MCNC: 1.2 NG/DL (ref 0.9–1.8)
T4 FREE SERPL-MCNC: 1.2 NG/DL (ref 0.9–1.8)
TRIGL SERPL-MCNC: 167 MG/DL (ref 35–150)
TRIGL SERPL-MCNC: 168 MG/DL (ref 35–150)
TSH, 3RD GENERATION: 0.98 UIU/ML (ref 0.36–3.74)
UROBILINOGEN UR QL STRIP.AUTO: 0.2 EU/DL (ref 0.2–1)
VLDLC SERPL CALC-MCNC: 33.4 MG/DL (ref 6–23)
VLDLC SERPL CALC-MCNC: 33.6 MG/DL (ref 6–23)

## 2022-06-08 RX ORDER — SEMAGLUTIDE 1.34 MG/ML
0.5 INJECTION, SOLUTION SUBCUTANEOUS
Qty: 9 PEN | Refills: 5 | Status: SHIPPED | OUTPATIENT
Start: 2022-06-08 | End: 2022-06-14 | Stop reason: SDUPTHER

## 2022-06-14 ENCOUNTER — OFFICE VISIT (OUTPATIENT)
Dept: FAMILY MEDICINE CLINIC | Facility: CLINIC | Age: 57
End: 2022-06-14
Payer: COMMERCIAL

## 2022-06-14 VITALS
HEART RATE: 97 BPM | DIASTOLIC BLOOD PRESSURE: 72 MMHG | SYSTOLIC BLOOD PRESSURE: 120 MMHG | WEIGHT: 215 LBS | BODY MASS INDEX: 34.55 KG/M2 | OXYGEN SATURATION: 98 % | HEIGHT: 66 IN

## 2022-06-14 DIAGNOSIS — Z79.4 TYPE 2 DIABETES MELLITUS WITHOUT COMPLICATION, WITH LONG-TERM CURRENT USE OF INSULIN (HCC): ICD-10-CM

## 2022-06-14 DIAGNOSIS — E03.9 ACQUIRED HYPOTHYROIDISM: ICD-10-CM

## 2022-06-14 DIAGNOSIS — E11.69 HYPERLIPIDEMIA ASSOCIATED WITH TYPE 2 DIABETES MELLITUS (HCC): ICD-10-CM

## 2022-06-14 DIAGNOSIS — E78.5 HYPERLIPIDEMIA ASSOCIATED WITH TYPE 2 DIABETES MELLITUS (HCC): ICD-10-CM

## 2022-06-14 DIAGNOSIS — E11.9 TYPE 2 DIABETES MELLITUS WITHOUT COMPLICATION, WITH LONG-TERM CURRENT USE OF INSULIN (HCC): ICD-10-CM

## 2022-06-14 DIAGNOSIS — K21.9 GASTROESOPHAGEAL REFLUX DISEASE WITHOUT ESOPHAGITIS: ICD-10-CM

## 2022-06-14 DIAGNOSIS — I10 ESSENTIAL HYPERTENSION WITH GOAL BLOOD PRESSURE LESS THAN 130/80: Primary | ICD-10-CM

## 2022-06-14 PROCEDURE — 99214 OFFICE O/P EST MOD 30 MIN: CPT | Performed by: PHYSICIAN ASSISTANT

## 2022-06-14 PROCEDURE — 3051F HG A1C>EQUAL 7.0%<8.0%: CPT | Performed by: PHYSICIAN ASSISTANT

## 2022-06-14 RX ORDER — PANTOPRAZOLE SODIUM 40 MG/1
40 TABLET, DELAYED RELEASE ORAL DAILY
Qty: 30 TABLET | Refills: 2 | Status: SHIPPED | OUTPATIENT
Start: 2022-06-14

## 2022-06-14 RX ORDER — LISINOPRIL 10 MG/1
10 TABLET ORAL DAILY
Qty: 90 TABLET | Refills: 1 | Status: SHIPPED | OUTPATIENT
Start: 2022-06-14

## 2022-06-14 RX ORDER — ROSUVASTATIN CALCIUM 40 MG/1
40 TABLET, COATED ORAL DAILY
Qty: 90 TABLET | Refills: 1 | Status: SHIPPED | OUTPATIENT
Start: 2022-06-14

## 2022-06-14 RX ORDER — GREEN TEA/HOODIA GORDONII 315-12.5MG
CAPSULE ORAL
COMMUNITY

## 2022-06-14 RX ORDER — DESVENLAFAXINE 100 MG/1
100 TABLET, EXTENDED RELEASE ORAL DAILY
Qty: 90 TABLET | Refills: 1 | Status: SHIPPED | OUTPATIENT
Start: 2022-06-14

## 2022-06-14 RX ORDER — INSULIN DEGLUDEC 200 U/ML
64 INJECTION, SOLUTION SUBCUTANEOUS DAILY
Qty: 5 PEN | Refills: 1 | Status: SHIPPED | OUTPATIENT
Start: 2022-06-14 | End: 2022-08-07 | Stop reason: SDUPTHER

## 2022-06-14 RX ORDER — LEVOTHYROXINE SODIUM 112 UG/1
112 TABLET ORAL
Qty: 90 TABLET | Refills: 1 | Status: SHIPPED | OUTPATIENT
Start: 2022-06-14

## 2022-06-14 RX ORDER — ARIPIPRAZOLE 5 MG/1
5 TABLET ORAL DAILY
COMMUNITY
Start: 2022-04-25 | End: 2022-06-14 | Stop reason: SDUPTHER

## 2022-06-14 RX ORDER — ARIPIPRAZOLE 5 MG/1
5 TABLET ORAL DAILY
Qty: 90 TABLET | Refills: 1 | Status: SHIPPED | OUTPATIENT
Start: 2022-06-14

## 2022-06-14 RX ORDER — SEMAGLUTIDE 1.34 MG/ML
0.5 INJECTION, SOLUTION SUBCUTANEOUS
Qty: 9 PEN | Refills: 5 | Status: SHIPPED | OUTPATIENT
Start: 2022-06-14

## 2022-06-14 NOTE — PROGRESS NOTES
Patient: Mary Freeman  YOB: 1965  Patient Age 61 y.o. Patient sex: female  Medical Record:  [id number]  Visit Date:6/14/2022   Author:  Ailyn Huffman. [facility] Note     Chief complaint  Mary Freeman  is a 61 y.o. female who was seen on 06/14/22  3:31 PM  for the following reasons:    Chief Complaint   Patient presents with    Diabetes     follow up       Current Medical problems addressed    Tom Roth is a 62-year-old female who presents today for follow-up regarding her chronic illness. She has a history of type 2 diabetes, acquired hypothyroidism, hyperlipidemia, reflux, RAFA, hypertension,Anxiety, hypercalcemia, her last labs were done on 5/27/2022. Diabetes A1c noted to be 7 which is stable compared to labs from November. Micro albumin to creatinine ratio noted ratio was 5 which is well controlled. Urinalysis did show specific gravity slightly elevated at 1.030 otherwise was negative for bladder glucose comprehensive metabolic panel noted kidney functions are stable with BUN 18 creatinine 0.80 and GFR greater than 60 glucose was 129 otherwise her labs are within normal range patient is currently treated with Ozempic 0.5 mg. Hyperlipidemia patient is currently treated with Crestor 40 mg and lipid panel noted to show total cholesterol 143, triglycerides 167, LDL 62.6, HDL 47 these are to goal other than the triglycerides which are about 17.55 patient is tolerating medicine taking appropriately    Reflux patient is currently treated with Protonix 40 mg reports symptoms are  Worsening in chest wall. She denies any blood in the stool or abdominal pain. She is having to use tums.   She notes she had ercp for stone left over after gallbladder removed and had stone removed from cbd    Patient is currently managing her depression anxiety with Pristiq 100 mg denies suicidal thoughts and reports symptoms are are stable     Hypothyroidism patient is currently treating with Synthroid 112 mcg dose and T4 was 1.2 and TSH is 0.984 which is stable uses euthyroid we will continue his current dose. Hypertension patient blood pressure today is stable she denies any chest pain or shortness of breath she is currently taking lisinopril 10 mg for symptoms are stable    She had chronic low back pain and got some local injections and that has helped significantly   She is also doing stem cell injections in left knee and its helping. ASSESSMENT AND PLAN    ICD-10-CM    1. Essential hypertension with goal blood pressure less than 130/80  I10 lisinopril (PRINIVIL;ZESTRIL) 10 MG tablet     pantoprazole (PROTONIX) 40 MG tablet     rosuvastatin (CRESTOR) 40 MG tablet   2. Type 2 diabetes mellitus without complication, with long-term current use of insulin (HCC)  E11.9     Z79.4    3. Acquired hypothyroidism  E03.9    4. Hyperlipidemia associated with type 2 diabetes mellitus (HCC)  E11.69 lisinopril (PRINIVIL;ZESTRIL) 10 MG tablet    E78.5 pantoprazole (PROTONIX) 40 MG tablet     rosuvastatin (CRESTOR) 40 MG tablet   5. Gastroesophageal reflux disease without esophagitis  K21.9 [facility]     lisinopril (PRINIVIL;ZESTRIL) 10 MG tablet     pantoprazole (PROTONIX) 40 MG tablet     rosuvastatin (CRESTOR) 40 MG tablet      Suha was seen today for diabetes. Diagnoses and all orders for this visit:    Essential hypertension with goal blood pressure less than 130/80  -     lisinopril (PRINIVIL;ZESTRIL) 10 MG tablet; Take 1 tablet by mouth daily  -     pantoprazole (PROTONIX) 40 MG tablet; Take 1 tablet by mouth daily TAKE ONE TABLET BY MOUTH ONCE DAILY FOR GASTROESOPHAGEAL REFLUX  -     rosuvastatin (CRESTOR) 40 MG tablet;  Take 1 tablet by mouth daily    Type 2 diabetes mellitus without complication, with long-term current use of insulin (HCC)    Acquired hypothyroidism    Hyperlipidemia associated with type 2 diabetes mellitus (HCC)  -     lisinopril (PRINIVIL;ZESTRIL) 10 MG tablet; Take 1 tablet by mouth daily  -     pantoprazole (PROTONIX) 40 MG tablet; Take 1 tablet by mouth daily TAKE ONE TABLET BY MOUTH ONCE DAILY FOR GASTROESOPHAGEAL REFLUX  -     rosuvastatin (CRESTOR) 40 MG tablet; Take 1 tablet by mouth daily    Gastroesophageal reflux disease without esophagitis  -     [facility]  -     lisinopril (PRINIVIL;ZESTRIL) 10 MG tablet; Take 1 tablet by mouth daily  -     pantoprazole (PROTONIX) 40 MG tablet; Take 1 tablet by mouth daily TAKE ONE TABLET BY MOUTH ONCE DAILY FOR GASTROESOPHAGEAL REFLUX  -     rosuvastatin (CRESTOR) 40 MG tablet; Take 1 tablet by mouth daily    Other orders  -     Semaglutide,0.25 or 0.5MG/DOS, (OZEMPIC, 0.25 OR 0.5 MG/DOSE,) 2 MG/1.5ML SOPN; Inject 0.5 mg into the skin every 7 days  -     desvenlafaxine succinate (PRISTIQ) 100 MG TB24 extended release tablet; Take 1 tablet by mouth daily  -     levothyroxine (SYNTHROID) 112 MCG tablet; Take 1 tablet by mouth every morning (before breakfast)  -     Insulin Degludec (TRESIBA FLEXTOUCH) 200 UNIT/ML SOPN; Inject 64 Units into the skin daily  -     ARIPiprazole (ABILIFY) 5 MG tablet; Take 1 tablet by mouth daily      No follow-up provider specified. Orders Placed This Encounter   Procedures    [facility]     Referral Priority:   Routine     Referral Type:   Eval and Treat     Referral Reason:   Specialty Services Required     Referral Location:   [facility]     Requested Specialty:   Gastroenterology     Number of Visits Requested:   1       Past Medical History: Allergies:   Allergies   Allergen Reactions    Metformin Diarrhea       Current Medications:   Medications marked \"taking\" at this time:  Current Outpatient Medications   Medication Sig Dispense Refill    lisinopril (PRINIVIL;ZESTRIL) 10 MG tablet Take 1 tablet by mouth daily 90 tablet 1    pantoprazole (PROTONIX) 40 MG tablet Take 1 tablet by mouth daily TAKE ONE TABLET BY MOUTH ONCE DAILY FOR GASTROESOPHAGEAL REFLUX 30 tablet 2    rosuvastatin (CRESTOR) 40 MG tablet Take 1 tablet by mouth daily 90 tablet 1    Semaglutide,0.25 or 0.5MG/DOS, (OZEMPIC, 0.25 OR 0.5 MG/DOSE,) 2 MG/1.5ML SOPN Inject 0.5 mg into the skin every 7 days 9 pen 5    desvenlafaxine succinate (PRISTIQ) 100 MG TB24 extended release tablet Take 1 tablet by mouth daily 90 tablet 1    levothyroxine (SYNTHROID) 112 MCG tablet Take 1 tablet by mouth every morning (before breakfast) 90 tablet 1    Insulin Degludec (TRESIBA FLEXTOUCH) 200 UNIT/ML SOPN Inject 64 Units into the skin daily 5 pen 1    ARIPiprazole (ABILIFY) 5 MG tablet Take 1 tablet by mouth daily 90 tablet 1    Insulin Pen Needle 31G X 5 MM MISC 1 each by Other route daily 100 each 5    ascorbic acid (VITAMIN C) 500 MG tablet Take by mouth      calcium carbonate 1500 (600 Ca) MG TABS tablet Take 600 mg by mouth 2 times daily      Cholecalciferol 50 MCG (2000 UT) TABS Take 10,000 Int'l Units by mouth      estrogens, conjugated,-methylTESTOSTERone (ESTRATEST HS) 0.625-1.25 MG per tablet Take 1 tablet by mouth daily.  fluticasone (FLONASE) 50 MCG/ACT nasal spray 2 sprays by Nasal route daily      hydrocortisone valerate (WEST-BRAYAN) 0.2 % ointment Apply topically 2 times daily      loratadine (CLARITIN) 10 MG tablet Take 10 mg by mouth daily as needed      naphazoline-pheniramine (OPCON-A) 0.027-0.315 % SOLN Apply 1 drop to eye 4 times daily as needed      ondansetron (ZOFRAN-ODT) 4 MG disintegrating tablet Take 4 mg by mouth every 8 hours as needed      valACYclovir (VALTREX) 1 g tablet Take 1,000 mg by mouth 2 times daily      CPAP Machine MISC by Other route      Probiotic Acidophilus (FLORANEX) TABS Take by mouth       No current facility-administered medications for this visit.         Current Problem List:   Patient Active Problem List   Diagnosis    Hyperlipidemia associated with type 2 diabetes mellitus ([facility] [address]    Essential hypertension with goal blood pressure less than 130/80    Acquired hypothyroidism    Family history of hyperlipidemia    Anxiety    Severe obesity ([address]    Family history of ischemic heart disease    Hypocalcemia    HSV infection    Vaginal dysplasia    Type 2 diabetes mellitus without complication, with long-term current use of insulin ([address]    History of left breast cancer    Severe episode of recurrent major depressive disorder, without psychotic features ([address]    Family history of sudden cardiac death in brother    Current moderate episode of major depressive disorder ([facility] [address]    Long-term insulin use ([facility] [address]    Gastroesophageal reflux disease without esophagitis    RAFA (obstructive sleep apnea)       Social History:   Social History     Tobacco Use    Smoking status: Never Smoker    Smokeless tobacco: Never Used   Substance Use Topics    Alcohol use: No       Family History:   Family History   Problem Relation Age of Onset    Heart Attack Maternal Grandfather     Diabetes Paternal Grandmother     Diabetes Paternal Grandfather     No Known Problems Brother     Heart Defect Mother     Cancer Maternal Grandmother     Hypertension Father     Diabetes Father     High Cholesterol Mother     Hypertension Mother     High Cholesterol Father        Surgical History:  Past Surgical History:   Procedure Laterality Date    BREAST LUMPECTOMY Left 1994    CHOLECYSTECTOMY      2008    COLONOSCOPY N/A 6/13/2017    Jose--sigmoid hyperplastic polyp--5 year recall    COLONOSCOPY  2008    Dr Jorge Chirinos (CERVIX STATUS UNKNOWN)  1993    THYROIDECTOMY  2012       ROS  Review of Systems   Constitutional: Negative for fever. Eyes: Negative for visual disturbance. Respiratory: Negative for cough and shortness of breath. Cardiovascular: Negative for chest pain. Neurological: Negative for syncope.        /72 (Site: Right Upper Arm, Position: Sitting)   Pulse 97   Ht 5' 6\" (1.676 m)   Wt 215 lb (97.5 kg)   SpO2 98%   BMI 34.70 kg/m²   Body mass index is 34.7 kg/m². Physical Exam    Physical Exam  Vitals and nursing note reviewed. Constitutional:       Appearance: Normal appearance. Eyes:      Conjunctiva/sclera: Conjunctivae normal.   Cardiovascular:      Rate and Rhythm: Normal rate and regular rhythm. Pulmonary:      Effort: Pulmonary effort is normal.      Breath sounds: Normal breath sounds. Musculoskeletal:      Right lower leg: No edema. Left lower leg: No edema. Skin:     General: Skin is warm. Neurological:      Mental Status: She is alert and oriented to person, place, and time. Psychiatric:         Mood and Affect: Mood normal.          There are no Patient Instructions on file for this visit. I have reviewed the patient's past medical history, social history and family history and vitals. We have discussed treatment plan and follow up and given patient instructions. Patient's questions are answered and we will follow up as indicated. Return in about 6 months (around 12/14/2022) for hyperlipidemia, Diabetes, htn. Karyn Coronado PA-C  3:31 PM  6/14/2022

## 2022-06-14 NOTE — PATIENT INSTRUCTIONS
Call in august to schedule your follow up   If you would like to follow me. Starting Sept 1st, 2022  I will be located at :  [address] with [facility]  [address] [facility] [address]  Phone is [phone]    Patient Education        Learning About the [address] Diet  What is the Mediterranean diet? The Mediterranean diet is a style of eating rather than a diet plan. It features foods eaten in [place], [place], [place] and [place], and other countries along the [facility]. It emphasizes eating foods like fish, fruits, vegetables, beans, high-fiber breads and whole grains, nuts, and oliveoil. This style of eating includes limited red meat, cheese, and sweets. Why choose the Mediterranean diet? A Mediterranean-style diet may improve heart health. It contains more fat than other heart-healthy diets. But the fats are mainly from nuts, unsaturated oils (such as fish oils and olive oil), and certain nut or seed oils (such as canola, soybean, or flaxseed oil). These fats may help protect the heart andblood vessels. How can you get started on the Mediterranean diet? Here are some things you can do to switch to a more Mediterranean way of eating. What to eat   Eat a variety of fruits and vegetables each day, such as grapes, blueberries, tomatoes, broccoli, peppers, figs, olives, spinach, eggplant, beans, lentils, and chickpeas.  Eat a variety of whole-grain foods each day, such as oats, brown rice, and whole wheat bread, pasta, and couscous.  Eat fish at least 2 times a week. Try tuna, salmon, mackerel, lake trout, herring, or sardines.  Eat moderate amounts of low-fat dairy products, such as milk, cheese, or yogurt.  Eat moderate amounts of poultry and eggs.  Choose healthy (unsaturated) fats, such as nuts, olive oil, and certain nut or seed oils like canola, soybean, and flaxseed.    Limit unhealthy (saturated) fats, such as butter, palm oil, and coconut oil. And limit fats found in animal products, such as meat and dairy products made with whole milk. Try to eat red meat only a few times a month in very small amounts.  Limit sweets and desserts to only a few times a week. This includes sugar-sweetened drinks like soda. The Mediterranean diet may also include red wine with your meal--1 glass eachday for women and up to 2 glasses a day for men. Tips for eating at home   Use herbs, spices, garlic, lemon zest, and citrus juice instead of salt to add flavor to foods.  Add avocado slices to your sandwich instead of adrian.  Have fish for lunch or dinner instead of red meat. Brush the fish with olive oil, and broil or grill it.  Sprinkle your salad with seeds or nuts instead of cheese. Gilberto Perfect with olive or canola oil instead of butter or oils that are high in saturated fat.  Switch from 2% milk or whole milk to 1% or fat-free milk.  Dip raw vegetables in a vinaigrette dressing or hummus instead of dips made from mayonnaise or sour cream.   Have a piece of fruit for dessert instead of a piece of cake. Try baked apples, or have some dried fruit. Tips for eating out   Try broiled, grilled, baked, or poached fish instead of having it fried or breaded.  Ask your  to have your meals prepared with olive oil instead of butter.  Order dishes made with marinara sauce or sauces made from olive oil. Avoid sauces made from cream or mayonnaise.  Choose whole-grain breads, whole wheat pasta and pizza crust, brown rice, beans, and lentils.  Cut back on butter or margarine on bread. Instead, you can dip your bread in a small amount of olive oil.  Ask for a side salad or grilled vegetables instead of french fries or chips. Where can you learn more? Go to https://jose.[employer]. org and sign in to your [employer] account. Enter [phone] in the [facility] box to learn more about \"Learning About the Mediterranean Diet. \"     If

## 2022-06-20 ASSESSMENT — ENCOUNTER SYMPTOMS
SHORTNESS OF BREATH: 0
COUGH: 0

## 2022-06-27 NOTE — PROGRESS NOTES
[address] [place] [address] Court, [address] [facility]  [phone]    Patient Name:  Carlos Enrique Stapleton  YOB: 1965    Pursuant to the emergency declaration under the [address] [place] [address], [facility] waiver authority and the [employer] Act, this Virtual  Visit was conducted, with patient's consent, to reduce the patient's risk of exposure to COVID-19 and provide continuity of care for an established patient. Telehealth encounter is a billable service, with coverage as determined by the insurance carrier. Services were provided through a video synchronous discussion virtually to substitute for in-person clinic visit. Carlos Enrique Stapleton is a 61 y.o. female who was seen by synchronous (real-time) audio-video technology on 6/28/2022. Consent:  She and/or her healthcare decision maker is aware that this patient-initiated Telehealth encounter is a billable service, with coverage as determined by her insurance carrier. She is aware that she may receive a bill and has provided verbal consent to proceed: Yes        Office Visit 6/28/2022    CHIEF COMPLAINT:    Chief Complaint   Patient presents with    CPAP/BiPAP    Sleep Apnea       HISTORY OF PRESENT ILLNESS:  Patient is being seen today via virtual visit. Patient was diagnosed with sleep apnea in 2013 with AHI 47.3/hr with lowest oxygen saturation 72%. She is prescribed APAP  10-16 cm using a full face mask. Download reveals 100% compliance on pap thearpy for the past year with average nightly use 9.5 hours. AHI is down to 2.4/hr on therapy with average pressure used 11.4-14.6 cm. She denies any problems with mask or pressure. She is sleeping well and waking rested. She is a  and states that working hours are anywhere from 8 hrs to 11 hrs. She is able to stay awake and have energy during the day. She needs a new supply order and this will be done. Past Medical History:   Diagnosis Date    Cancer [facility])     left Breast cancer at 33 yoa, lumpectomy    Depression     [facility]     Diverticulosis 2017    GERD (gastroesophageal reflux disease)     HSV infection 9/16/2016    Hx of colonic polyps 0520    uncertain path in past.  2017--hyperplastic    Hypercholesterolemia     Hypertension     Hypothyroid     2/2 radiation from breast cancer and thyroidectomy    Sleep apnea     does not require cpap machine       Patient Active Problem List   Diagnosis    Hyperlipidemia associated with type 2 diabetes mellitus ([address]    Essential hypertension with goal blood pressure less than 130/80    Acquired hypothyroidism    Family history of hyperlipidemia    Anxiety    Severe obesity ([address]    Family history of ischemic heart disease    Hypocalcemia    HSV infection    Vaginal dysplasia    Type 2 diabetes mellitus without complication, with long-term current use of insulin ([address]    History of left breast cancer    Severe episode of recurrent major depressive disorder, without psychotic features ([address]    Family history of sudden cardiac death in brother    Current moderate episode of major depressive disorder ([address]    Long-term insulin use ([address]    Gastroesophageal reflux disease without esophagitis    RAFA (obstructive sleep apnea)           Past Surgical History:   Procedure Laterality Date    BREAST LUMPECTOMY Left 1994    CHOLECYSTECTOMY      2008    COLONOSCOPY N/A 6/13/2017    Jose--sigmoid hyperplastic polyp--5 year recall    COLONOSCOPY  2008    Dr Donta Reed (CERVIX STATUS UNKNOWN)  1993    THYROIDECTOMY  2012           Social History     Socioeconomic History    Marital status:      Spouse name: Not on file    Number of children: Not on file    Years of education: Not on file    Highest education level: Not on file   Occupational History    Not on file   Tobacco Use    Smoking status: Never Smoker    Smokeless tobacco: Never Used   Substance and Sexual Activity    Alcohol use: No    Drug use: No    Sexual activity: Not on file   Other Topics Concern    Not on file   Social History Narrative    Live at home with . Step daughter that comes every other weekend. Social Determinants of Health     Financial Resource Strain:     Difficulty of Paying Living Expenses: Not on file   Food Insecurity:     Worried About Running Out of Food in the Last Year: Not on file    Christiano of Food in the Last Year: Not on file   Transportation Needs:     Lack of Transportation (Medical): Not on file    Lack of Transportation (Non-Medical):  Not on file   Physical Activity:     Days of Exercise per Week: Not on file    Minutes of Exercise per Session: Not on file   Stress:     Feeling of Stress : Not on file   Social Connections:     Frequency of Communication with Friends and Family: Not on file    Frequency of Social Gatherings with Friends and Family: Not on file    Attends [employer] Services: Not on file    Active Member of [address] [place] [address] or Organizations: Not on file    Attends Club or Organization Meetings: Not on file    Marital Status: Not on file   Intimate Partner Violence:     Fear of Current or Ex-Partner: Not on file    Emotionally Abused: Not on file    Physically Abused: Not on file    Sexually Abused: Not on file   Housing Stability:     Unable to Pay for Housing in the Last Year: Not on file    Number of [place] in the Last Year: Not on file    Unstable Housing in the Last Year: Not on file         Family History   Problem Relation Age of Onset    Heart Attack Maternal Grandfather     Diabetes Paternal Grandmother     Diabetes Paternal Grandfather     No Known Problems Brother     Heart Defect Mother     Cancer Maternal Grandmother     Hypertension Father     Diabetes Father     High Cholesterol Mother     Hypertension Mother     High Cholesterol Father Allergies   Allergen Reactions    Metformin Diarrhea         Current Outpatient Medications   Medication Sig    lisinopril (PRINIVIL;ZESTRIL) 10 MG tablet Take 1 tablet by mouth daily    pantoprazole (PROTONIX) 40 MG tablet Take 1 tablet by mouth daily TAKE ONE TABLET BY MOUTH ONCE DAILY FOR GASTROESOPHAGEAL REFLUX    rosuvastatin (CRESTOR) 40 MG tablet Take 1 tablet by mouth daily    Semaglutide,0.25 or 0.5MG/DOS, (OZEMPIC, 0.25 OR 0.5 MG/DOSE,) 2 MG/1.5ML SOPN Inject 0.5 mg into the skin every 7 days    desvenlafaxine succinate (PRISTIQ) 100 MG TB24 extended release tablet Take 1 tablet by mouth daily    levothyroxine (SYNTHROID) 112 MCG tablet Take 1 tablet by mouth every morning (before breakfast)    Insulin Degludec (TRESIBA FLEXTOUCH) 200 UNIT/ML SOPN Inject 64 Units into the skin daily    CPAP Machine MISC by Other route    Probiotic Acidophilus (FLORANEX) TABS Take by mouth    ARIPiprazole (ABILIFY) 5 MG tablet Take 1 tablet by mouth daily    Insulin Pen Needle 31G X 5 MM MISC 1 each by Other route daily    ascorbic acid (VITAMIN C) 500 MG tablet Take by mouth    calcium carbonate 1500 (600 Ca) MG TABS tablet Take 600 mg by mouth 2 times daily    Cholecalciferol 50 MCG (2000 UT) TABS Take 10,000 Int'l Units by mouth    estrogens, conjugated,-methylTESTOSTERone (ESTRATEST HS) 0.625-1.25 MG per tablet Take 1 tablet by mouth daily.     fluticasone (FLONASE) 50 MCG/ACT nasal spray 2 sprays by Nasal route daily    hydrocortisone valerate (WEST-BRAYAN) 0.2 % ointment Apply topically 2 times daily    loratadine (CLARITIN) 10 MG tablet Take 10 mg by mouth daily as needed    naphazoline-pheniramine (OPCON-A) 0.027-0.315 % SOLN Apply 1 drop to eye 4 times daily as needed    ondansetron (ZOFRAN-ODT) 4 MG disintegrating tablet Take 4 mg by mouth every 8 hours as needed    valACYclovir (VALTREX) 1 g tablet Take 1,000 mg by mouth 2 times daily     No current facility-administered medications for this visit. REVIEW OF SYSTEMS:   CONSTITUTIONAL:   There is no history of fever, chills, night sweats. CARDIAC:   No chest pain, pressure, discomfort, palpitations, orthopnea, murmurs, or edema. GI:   No dysphagia, heartburn reflux, nausea/vomiting, diarrhea, abdominal pain, or bleeding. NEURO:   There is no history of AMS, persistent headache, decreased level of consciousness, seizures, or motor or sensory deficits. VIRTUAL EXAM  PHYSICAL EXAMINATION:  [ INSTRUCTIONS:  \"[x]\" Indicates a positive item  \"[]\" Indicates a negative item   Vital Signs: (As obtained by patient/caregiver at home)  There were no vitals taken for this visit.      Constitutional: [x] Appears well-developed and well-nourished [x] No apparent distress      [] Abnormal     Mental status: [x] Alert and awake  [x] Oriented to person/place/time [x] Able to follow commands    [] Abnormal -     Eyes:   EOM    [x]  Normal    [] Abnormal -   Sclera  [x]  Normal    [] Abnormal -          Discharge [x]  None visible   [] Abnormal -    HENT: [x] Normocephalic, atraumatic  [] Abnormal -  [x] Mouth/Throat: Mucous membranes are moist    External Ears [x] Normal  [] Abnormal -    Neck: [x] No visualized mass [] Abnormal -     Pulmonary/Chest: [x] Respiratory effort normal   [x] No visualized signs of difficulty breathing or respiratory distress        [] Abnormal -      Musculoskeletal:   [x] Normal gait with no signs of ataxia         [x] Normal range of motion of neck        [] Abnormal -     Neurological:        [x] No Facial Asymmetry (Cranial nerve 7 motor function) (limited exam due to video visit)          [x] No gaze palsy        [] Abnormal -         Skin:        [x] No significant exanthematous lesions or discoloration noted on facial skin         [] Abnormal -            Psychiatric:       [x] Normal Affect [] Abnormal -       [x] No Hallucinations    Other pertinent observable physical exam findings:-      ASSESSMENT: (Medical Decision Making)      Diagnosis Orders   1. RAFA (obstructive sleep apnea) she is using and benefiting from pap therapy. Continue current settings with nightly compliance  DME - DURABLE MEDICAL EQUIPMENT        PLAN:  Continue APAP 10-16 cm with nightly compliance  Renew supplies  Follow up will be in 1 year or sooner if needed     Orders Placed This Encounter   Procedures    DME - [address]  Phone: [phone] [facility] [address]  [address] [place] [id number]  Dept: [phone]      Patient Name: Roslyn Whipple  : 1965  Gender: female  Address: [address] [place], [place] [address]   Patient phone: [phone] (home)       Primary Insurance: Payor: Karolyn Tellez / Plan: Ede Stiles / Product Type: *No Product type* /   Subscriber ID: [id number] - (Carolina COLVIN)      AMB Supply Order  Order Details     DME Location:   Order Date: 2022   There were no encounter diagnoses.              (  X   )Supplies Needed       apap Machine   (     ) CPAP Unit  (     ) Auto CPAP Unit  (     ) BiLevel Unit  (     ) Auto BiLevel Unit  (     ) ASV        (     ) Bilevel ST      Length of need: 12 months    Pressure:  10-16 cmH20  EPR:     Starting Ramp Pressure:   cm H20  Ramp Time: min        Patient had a diagnostic Apnea Hypopnea Index (AHI) of :    *SUPPLIES* Replace all as needed, or per coverage guidelines     Masks Type:  (  x  ) -Full Face Mask (1 per 3 mon)  (   x ) -Full Mask (1 per month) Interface/Cushion      (  ) -Nasal Mask (1 per 3 mon)  (  ) - Nasal Mask (1 per month) Interface/Cushion  (     ) -Pillow (2 per mon)  (     ) -Tqfkyvrfx (1 per 6 mon)            Other Supplies:    (   X  )-Avljzkpm (1 per 6 mon)  (   X  )-Xjjijc Tubing (1 per 3 mon)  (   X  )- Disposable Filter (2 per mon)  (   x  )-Wmgpgj Humidifier (1 per year)     (  x   )-Mankflgzg (sometimes used with Full Face Mask) (1 per 6 mos)  (    )-Tubing-without heat (1 per 3 mos)  (     )-Non-Disposable Filter (1 per 6 mos)  (  x   )-Water Chamber (1 per 6 mos)  (     )-Humidifier non-heated (1 per 5 yrs)      Signed Date: 6/28/2022  Electronically Signed By: KLAUDIA Sanders CNP  Electronically Dated:  6/28/2022     No orders of the defined types were placed in this encounter. Collaborating Physician: Dr. Leela Duarte      I spent at least 15 minutes with this established patient, and >50% of the time was spent counseling and/or coordinating care regarding sharath.     KLAUDIA Sanders CNP  Electronically signed

## 2022-06-28 ENCOUNTER — TELEMEDICINE (OUTPATIENT)
Dept: SLEEP MEDICINE | Age: 57
End: 2022-06-28
Payer: COMMERCIAL

## 2022-06-28 DIAGNOSIS — G47.33 OSA (OBSTRUCTIVE SLEEP APNEA): Primary | ICD-10-CM

## 2022-06-28 PROCEDURE — 99213 OFFICE O/P EST LOW 20 MIN: CPT | Performed by: NURSE PRACTITIONER

## 2022-06-28 ASSESSMENT — SLEEP AND FATIGUE QUESTIONNAIRES
HOW LIKELY ARE YOU TO NOD OFF OR FALL ASLEEP WHILE SITTING QUIETLY AFTER LUNCH WITHOUT ALCOHOL: 2
ESS TOTAL SCORE: 4
HOW LIKELY ARE YOU TO NOD OFF OR FALL ASLEEP WHILE WATCHING TV: 1
HOW LIKELY ARE YOU TO NOD OFF OR FALL ASLEEP WHEN YOU ARE A PASSENGER IN A CAR FOR AN HOUR WITHOUT A BREAK: 0
HOW LIKELY ARE YOU TO NOD OFF OR FALL ASLEEP IN A CAR, WHILE STOPPED FOR A FEW MINUTES IN TRAFFIC: 0
HOW LIKELY ARE YOU TO NOD OFF OR FALL ASLEEP WHILE SITTING INACTIVE IN A PUBLIC PLACE: 0
HOW LIKELY ARE YOU TO NOD OFF OR FALL ASLEEP WHILE LYING DOWN TO REST IN THE AFTERNOON WHEN CIRCUMSTANCES PERMIT: 1
HOW LIKELY ARE YOU TO NOD OFF OR FALL ASLEEP WHILE SITTING AND TALKING TO SOMEONE: 0
HOW LIKELY ARE YOU TO NOD OFF OR FALL ASLEEP WHILE SITTING AND READING: 0

## 2022-08-08 RX ORDER — INSULIN DEGLUDEC 200 U/ML
64 INJECTION, SOLUTION SUBCUTANEOUS DAILY
Qty: 5 PEN | Refills: 1 | Status: SHIPPED | OUTPATIENT
Start: 2022-08-08 | End: 2022-08-12 | Stop reason: SDUPTHER

## 2022-08-10 RX ORDER — INSULIN DEGLUDEC 200 U/ML
64 INJECTION, SOLUTION SUBCUTANEOUS DAILY
Qty: 5 PEN | Refills: 1 | OUTPATIENT
Start: 2022-08-10

## 2022-09-01 ENCOUNTER — OFFICE VISIT (OUTPATIENT)
Dept: FAMILY MEDICINE CLINIC | Facility: CLINIC | Age: 57
End: 2022-09-01
Payer: COMMERCIAL

## 2022-09-01 VITALS
TEMPERATURE: 98.3 F | OXYGEN SATURATION: 99 % | SYSTOLIC BLOOD PRESSURE: 138 MMHG | DIASTOLIC BLOOD PRESSURE: 80 MMHG | HEART RATE: 101 BPM | HEIGHT: 66 IN | BODY MASS INDEX: 35.68 KG/M2 | WEIGHT: 222 LBS | RESPIRATION RATE: 97 BRPM

## 2022-09-01 DIAGNOSIS — G89.29 CHRONIC PAIN OF LEFT KNEE: ICD-10-CM

## 2022-09-01 DIAGNOSIS — M25.562 CHRONIC PAIN OF LEFT KNEE: ICD-10-CM

## 2022-09-01 DIAGNOSIS — M25.462 EFFUSION OF LEFT KNEE: Primary | ICD-10-CM

## 2022-09-01 PROCEDURE — 99213 OFFICE O/P EST LOW 20 MIN: CPT | Performed by: PHYSICIAN ASSISTANT

## 2022-09-01 PROCEDURE — 20605 DRAIN/INJ JOINT/BURSA W/O US: CPT | Performed by: PHYSICIAN ASSISTANT

## 2022-09-01 RX ORDER — DEXAMETHASONE SODIUM PHOSPHATE 4 MG/ML
4 INJECTION, SOLUTION INTRA-ARTICULAR; INTRALESIONAL; INTRAMUSCULAR; INTRAVENOUS; SOFT TISSUE EVERY 6 HOURS
Status: DISCONTINUED | OUTPATIENT
Start: 2022-09-01 | End: 2022-09-01

## 2022-09-01 RX ORDER — DEXAMETHASONE SODIUM PHOSPHATE 4 MG/ML
4 INJECTION, SOLUTION INTRA-ARTICULAR; INTRALESIONAL; INTRAMUSCULAR; INTRAVENOUS; SOFT TISSUE ONCE
Qty: 1 ML | Refills: 0
Start: 2022-09-01 | End: 2022-09-01

## 2022-09-01 RX ADMIN — DEXAMETHASONE SODIUM PHOSPHATE 4 MG: 4 INJECTION, SOLUTION INTRA-ARTICULAR; INTRALESIONAL; INTRAMUSCULAR; INTRAVENOUS; SOFT TISSUE at 15:29

## 2022-09-01 ASSESSMENT — PATIENT HEALTH QUESTIONNAIRE - PHQ9
SUM OF ALL RESPONSES TO PHQ9 QUESTIONS 1 & 2: 1
SUM OF ALL RESPONSES TO PHQ QUESTIONS 1-9: 1
2. FEELING DOWN, DEPRESSED OR HOPELESS: 1
SUM OF ALL RESPONSES TO PHQ QUESTIONS 1-9: 1
1. LITTLE INTEREST OR PLEASURE IN DOING THINGS: 0

## 2022-09-01 NOTE — PROGRESS NOTES
[address] [place] [address]  Phone is [phone]      Patient: Juliette Lopez  YOB: 1965  Patient Age 61 y.o. Patient sex: female  Medical Record:  [id number]  Visit TLLQ:7/5/8628   Author:  Crow Foley. Conda Bence    [facility] Note     Chief complaint  Juliette Lopez  is a 61 y.o. female who was seen on 09/02/22  5:50 PM  for the following reasons:    Chief Complaint   Patient presents with    Knee Pain     Left--has had pain for 4-5 years,  more painful since last Monday--works at [address]    Has seen Gabbysamuel Owen in the past       Current Medical problems addressed    Mary Wisdom is a 62-year-old female who presents today for follow-up she has a history of diabetes, RAFA, hypothyroidism, hyperlipidemia, moderate major depression, and family history positive for ischemic heart disease. last labs were done on 5/27/2022 that time T4 was stable at 1.2 and TSH was stable at 0.984. A1c was stable at 7.0 CMP notes glucose is stable at 129 electrolytes are normal liver functions are normal kidney functions are normal.  Microalbumin to creatinine ratio was 5 which is within normal range. She notes its hurt on left knee since a  tour that was a lot of walking and had MRI with mild meniscus tears and did a cortisone shot that helped for two months. Its been chronic since then but 2.5 days ago it was so painful hurt to straighten knee . Tried staying off it, elevated and icing. Has apt with Dr Annie Mehta on Sept 20. ASSESSMENT AND PLAN    ICD-10-CM    1. Effusion of left knee  M25.462 20605 - [place] DRAIN/INJECT INTERMEDIATE JOINT/BURSA     dexamethasone (DECADRON) 4 MG/ML injection     DISCONTINUED: dexamethasone (DECADRON) injection 4 mg      2.  Chronic pain of left knee  M25.562 29642 - [place] DRAIN/INJECT INTERMEDIATE JOINT/BURSA    G89.29 dexamethasone (DECADRON) 4 MG/ML injection     DISCONTINUED: dexamethasone (DECADRON) injection 4 mg         Suha was seen today for knee pain. Diagnoses and all orders for this visit:    Effusion of left knee  -     20605 - [place] DRAIN/INJECT INTERMEDIATE JOINT/BURSA  -     Discontinue: dexamethasone (DECADRON) injection 4 mg  -     dexamethasone (DECADRON) 4 MG/ML injection; Inject 1 mL into the muscle once for 1 dose    Chronic pain of left knee  -     20605 - [place] DRAIN/INJECT INTERMEDIATE JOINT/BURSA  -     Discontinue: dexamethasone (DECADRON) injection 4 mg  -     dexamethasone (DECADRON) 4 MG/ML injection; Inject 1 mL into the muscle once for 1 dose    Plan includes the following:  Injection is done patient tolerated procedure  Ice rest elevat next 24 hours and follow up with ortho  No follow-up provider specified. Orders Placed This Encounter   Procedures    20605 - [place] DRAIN/INJECT INTERMEDIATE JOINT/BURSA     Left knee lateral approach with depo medrol 4mg  and lidocaine 1% 2ml         Past Medical History: Allergies:   Allergies   Allergen Reactions    Metformin Diarrhea       Current Medications:   Medications marked \"taking\" at this time:  Current Outpatient Medications   Medication Sig Dispense Refill    Insulin Degludec (TRESIBA FLEXTOUCH) 200 UNIT/ML SOPN Inject 64 Units into the skin daily 15 pen 1    lisinopril (PRINIVIL;ZESTRIL) 10 MG tablet Take 1 tablet by mouth daily 90 tablet 1    pantoprazole (PROTONIX) 40 MG tablet Take 1 tablet by mouth daily TAKE ONE TABLET BY MOUTH ONCE DAILY FOR GASTROESOPHAGEAL REFLUX 30 tablet 2    rosuvastatin (CRESTOR) 40 MG tablet Take 1 tablet by mouth daily 90 tablet 1    Semaglutide,0.25 or 0.5MG/DOS, (OZEMPIC, 0.25 OR 0.5 MG/DOSE,) 2 MG/1.5ML SOPN Inject 0.5 mg into the skin every 7 days 9 pen 5    desvenlafaxine succinate (PRISTIQ) 100 MG TB24 extended release tablet Take 1 tablet by mouth daily 90 tablet 1    levothyroxine (SYNTHROID) 112 MCG tablet Take 1 tablet by mouth every morning (before breakfast) 90 tablet 1    CPAP Machine MISC by Other route      Probiotic RAFA (obstructive sleep apnea)       Social History:   Social History     Tobacco Use    Smoking status: Never    Smokeless tobacco: Never   Substance Use Topics    Alcohol use: No       Family History:   Family History   Problem Relation Age of Onset    Heart Attack Maternal Grandfather     Diabetes Paternal Grandmother     Diabetes Paternal Grandfather     No Known Problems Brother     Heart Defect Mother     Cancer Maternal Grandmother     Hypertension Father     Diabetes Father     High Cholesterol Mother     Hypertension Mother     High Cholesterol Father        Surgical History:  Past Surgical History:   Procedure Laterality Date    BREAST LUMPECTOMY Left 1994    CHOLECYSTECTOMY      2008    COLONOSCOPY N/A 6/13/2017    Jose--sigmoid hyperplastic polyp--5 year recall    COLONOSCOPY  2008    Dr Angi Foley ([facility]  1993    THYROIDECTOMY  2012       ROS  Review of Systems   Constitutional:  Negative for chills and fever. Respiratory:  Negative for shortness of breath. Cardiovascular:  Negative for chest pain. Gastrointestinal:  Negative for abdominal pain. Musculoskeletal:  Positive for joint swelling. Negative for neck stiffness. Skin:  Negative for rash. Neurological:  Negative for weakness. /80   Pulse (!) 101   Temp 98.3 °F (36.8 °C)   Resp (!) 97   Ht 5' 6\" (1.676 m)   Wt 222 lb (100.7 kg)   SpO2 99%   BMI 35.83 kg/m²   Body mass index is 35.83 kg/m². Physical Exam    Physical Exam  Musculoskeletal:      Right knee: Normal.      Left knee: Effusion present. Tenderness present. Right ankle: Normal.      Left ankle: Normal.        I have reviewed the patient's past medical history, social history and family history and vitals. We have discussed treatment plan and follow up and given patient instructions. Patient's questions are answered and we will follow up as indicated.   Return in about 3 weeks (around 9/22/2022), or if symptoms worsen or fail to improve. Procedure Note   [facility]  [address]  Phone: [phone]    Chief Complaint:   Chief Complaint   Patient presents with    Knee Pain     Left--has had pain for 4-5 years,  more painful since last Monday--[facility] [address] [facility] [employer]    Has seen Mariah Weaver in the past       Indications for Procedure: edema/effusion and pain in left knee    Site: left knee     Procedure:   Informed consent was obtained from the patient. 2 cc of 1%Lidocaine without epinephrine was used with dexamethasone . Site was prepped with alcohol and betadine. Injection done  Sterile dressing applied      Patient tolerated the procedure well:   yes  Complications:  none  All questions answered: yes  Patient instructions given regarding injection site:  none    Patient advised to to keep area dry and clean and change dressing. If any surrounding redness, or fever develops patient advised to call or return immediately. Karla L. Aloha Robert Jaquelyn Frankel, PA-C  5:50 PM  9/2/2022

## 2022-09-02 ASSESSMENT — ENCOUNTER SYMPTOMS
SHORTNESS OF BREATH: 0
ABDOMINAL PAIN: 0

## 2022-09-04 ENCOUNTER — APPOINTMENT (OUTPATIENT)
Dept: GENERAL RADIOLOGY | Age: 57
End: 2022-09-04
Payer: COMMERCIAL

## 2022-09-04 ENCOUNTER — HOSPITAL ENCOUNTER (EMERGENCY)
Age: 57
Discharge: HOME | End: 2022-09-04
Attending: EMERGENCY MEDICINE
Payer: COMMERCIAL

## 2022-09-04 VITALS
HEIGHT: 66 IN | SYSTOLIC BLOOD PRESSURE: 144 MMHG | DIASTOLIC BLOOD PRESSURE: 103 MMHG | HEART RATE: 98 BPM | WEIGHT: 222 LBS | OXYGEN SATURATION: 98 % | BODY MASS INDEX: 35.68 KG/M2 | TEMPERATURE: 98 F | RESPIRATION RATE: 18 BRPM

## 2022-09-04 DIAGNOSIS — M25.562 CHRONIC PAIN OF LEFT KNEE: ICD-10-CM

## 2022-09-04 DIAGNOSIS — M23.92 INTERNAL DERANGEMENT OF LEFT KNEE: Primary | ICD-10-CM

## 2022-09-04 DIAGNOSIS — G89.29 CHRONIC PAIN OF LEFT KNEE: ICD-10-CM

## 2022-09-04 DIAGNOSIS — G57.02 PIRIFORMIS SYNDROME OF LEFT SIDE: ICD-10-CM

## 2022-09-04 PROCEDURE — 99284 EMERGENCY DEPT VISIT MOD MDM: CPT

## 2022-09-04 PROCEDURE — 96372 THER/PROPH/DIAG INJ SC/IM: CPT

## 2022-09-04 PROCEDURE — 73562 X-RAY EXAM OF KNEE 3: CPT

## 2022-09-04 PROCEDURE — 6360000002 HC RX W HCPCS

## 2022-09-04 RX ORDER — METHOCARBAMOL 500 MG/1
500 TABLET, FILM COATED ORAL 4 TIMES DAILY
Qty: 40 TABLET | Refills: 0 | Status: SHIPPED | OUTPATIENT
Start: 2022-09-04 | End: 2022-09-14

## 2022-09-04 RX ORDER — NAPROXEN 500 MG/1
500 TABLET ORAL 2 TIMES DAILY
Qty: 14 TABLET | Refills: 0 | Status: SHIPPED | OUTPATIENT
Start: 2022-09-04 | End: 2022-09-11

## 2022-09-04 RX ORDER — DEXAMETHASONE SODIUM PHOSPHATE 10 MG/ML
8 INJECTION INTRAMUSCULAR; INTRAVENOUS ONCE
Status: COMPLETED | OUTPATIENT
Start: 2022-09-04 | End: 2022-09-04

## 2022-09-04 RX ORDER — KETOROLAC TROMETHAMINE 30 MG/ML
30 INJECTION, SOLUTION INTRAMUSCULAR; INTRAVENOUS ONCE
Status: COMPLETED | OUTPATIENT
Start: 2022-09-04 | End: 2022-09-04

## 2022-09-04 RX ORDER — METHOCARBAMOL 500 MG/1
500 TABLET, FILM COATED ORAL 4 TIMES DAILY
Qty: 40 TABLET | Refills: 0 | Status: SHIPPED | OUTPATIENT
Start: 2022-09-04 | End: 2022-09-04

## 2022-09-04 RX ORDER — NAPROXEN 500 MG/1
500 TABLET ORAL 2 TIMES DAILY
Qty: 14 TABLET | Refills: 0 | Status: SHIPPED | OUTPATIENT
Start: 2022-09-04 | End: 2022-09-04

## 2022-09-04 RX ADMIN — DEXAMETHASONE SODIUM PHOSPHATE 8 MG: 10 INJECTION, SOLUTION INTRAMUSCULAR; INTRAVENOUS at 12:41

## 2022-09-04 RX ADMIN — KETOROLAC TROMETHAMINE 30 MG: 30 INJECTION, SOLUTION INTRAMUSCULAR at 12:41

## 2022-09-04 ASSESSMENT — ENCOUNTER SYMPTOMS
SHORTNESS OF BREATH: 0
ABDOMINAL PAIN: 0
BACK PAIN: 0
NAUSEA: 0
DIARRHEA: 0
COLOR CHANGE: 0
VOMITING: 0

## 2022-09-04 ASSESSMENT — PAIN DESCRIPTION - LOCATION: LOCATION: KNEE

## 2022-09-04 ASSESSMENT — PAIN - FUNCTIONAL ASSESSMENT: PAIN_FUNCTIONAL_ASSESSMENT: 0-10

## 2022-09-04 ASSESSMENT — PAIN SCALES - GENERAL: PAINLEVEL_OUTOF10: 8

## 2022-09-04 NOTE — ED PROVIDER NOTES
[facility] Provider Note                   PCP:                Brenda Vann               Age: 61 y.o. Sex: female       ICD-10-CM    1. Internal derangement of left knee  M23.92 naproxen (NAPROSYN) 500 MG tablet      2. Chronic pain of left knee  M25.562 naproxen (NAPROSYN) 500 MG tablet    G89.29       3. Piriformis syndrome of left side  G57.02 methocarbamol (ROBAXIN) 500 MG tablet     naproxen (NAPROSYN) 500 MG tablet     DISCONTINUED: methocarbamol (ROBAXIN) 500 MG tablet          DISPOSITION Decision To Discharge 09/04/2022 01:23:25 PM        MDM  Number of Diagnoses or Management Options  Chronic pain of left knee  Internal derangement of left knee  Piriformis syndrome of left side  Diagnosis management comments: Vital signs reviewed, patient stable, NAD, afebrile, nontoxic in appearance     Will administer IM Decadron and IM Toradol in the emergency department for pain    will obtain x-ray due to likely presence of arthritis. Patient states pain is been going on for 4 years and getting worse, no new injury, has not had an x-ray in several years. X-ray of left knee negative for any acute bony abnormality, fracture, dislocation    Patient states it is difficult to ambulate around due to pain. Will discharge patient with a knee immobilizer, crutches. Patient states she has an appointment with Arvind Segovia in 2 weeks for evaluation of her left knee. Patient expressed interest in being referred to a [facility] orthopedic provider to see if she can be evaluated earlier. Based on history, physical exam, I do not feel any further imaging or any lab work is warranted at this time in the emergency department. I discussed physical exam findings, laboratory and/or imaging findings, treatment and follow-up with the patient and those who were present. I answered any questions they had.   They verbalized that they understood and were in agreement with treatment and disposition. I discussed signs and symptoms that would warrant a prompt return to the emergency department with the patient. I included the signs and symptoms on discharge paperwork. Patient verbalized that they understood. Patient placed in a knee immobilizer and stated that knee pain improved with ambulation. Patient given crutches and states that pain is tolerable. Patient discharged home in stable condition with a prescription for Robaxin, Naprosyn. Cautions given verbally and in discharge paperwork regarding his medications. She is to follow-up with her primary care provider on Tuesday as well as contact [place] [facility] for a follow-up. Reiterated strict return to ED precautions. Patient verbalized that she understood and was in agreement with this.            Amount and/or Complexity of Data Reviewed  Tests in the radiology section of CPT®: ordered and reviewed  Review and summarize past medical records: yes  Independent visualization of images, tracings, or specimens: yes (Independent visualization of imaging)    Risk of Complications, Morbidity, and/or Mortality  Presenting problems: low  Diagnostic procedures: low  Management options: low    Patient Progress  Patient progress: stable       Orders Placed This Encounter   Procedures    XR KNEE LEFT (3 VIEWS)    [employer] Knee Immobilizer, Left; 14\"    [employer] Crutches; Pair, Left Side Injury; Med (5'2\"-5'10\")        Medications   dexamethasone (DECADRON) injection 8 mg (8 mg IntraMUSCular Given 9/4/22 1241)   ketorolac (TORADOL) injection 30 mg (30 mg IntraMUSCular Given 9/4/22 1241)       Discharge Medication List as of 9/4/2022  1:30 PM           Kain Woods is a 61 y.o. female who presents to the Emergency Department with chief complaint of    Chief Complaint   Patient presents with    Knee Pain      59-year-old female with history of chronic left knee pain, type 2 diabetes, left-sided breast cancer, hypertension, hyperlipidemia, anxiety, GERD presents to the emergency department today with chief complaint of worsening left knee pain over the past week. Patient was evaluated 2 days ago at her primary care provider where she was given an injection of steroid in the knee. States pain improved until this morning when she suddenly felt pain worse in the inner aspect of her knee. Is described as a sharp ache and described as radiating into left thigh and down left lower leg. patient denies known injury, trauma, fall, low back pain, chest pain, shortness of breath, abdominal pain, nausea, vomiting, diarrhea, fevers or chills. Standing and touch makes pain worse. Nothing makes pain better. Steroid shot in the left knee tried without relief. The history is provided by the patient. No  was used. Review of Systems   Constitutional:  Negative for chills, fatigue and fever. Respiratory:  Negative for shortness of breath. Cardiovascular:  Negative for chest pain and palpitations. Gastrointestinal:  Negative for abdominal pain, diarrhea, nausea and vomiting. Musculoskeletal:  Negative for back pain. Left knee pain   Skin:  Negative for color change. Neurological:  Negative for weakness and headaches. All other systems reviewed and are negative.     Past Medical History:   Diagnosis Date    Cancer [facility])     left Breast cancer at 33 yoa, lumpectomy    Depression     Diabetes ([facility] [address]     Diverticulosis 2017    GERD (gastroesophageal reflux disease)     HSV infection 9/16/2016    Hx of colonic polyps 0130    uncertain path in past.  2017--hyperplastic    Hypercholesterolemia     Hypertension     Hypothyroid     2/2 radiation from breast cancer and thyroidectomy    Sleep apnea     does not require cpap machine        Past Surgical History:   Procedure Laterality Date    BREAST LUMPECTOMY Left 1994    CHOLECYSTECTOMY      2008    COLONOSCOPY N/A 6/13/2017    Jose--sigmoid hyperplastic polyp--5 year recall    COLONOSCOPY  2008    Dr Namita Smart ([facility])  [facility]  2012        Family History   Problem Relation Age of Onset    Heart Attack Maternal Grandfather     Diabetes Paternal Grandmother     Diabetes Paternal Grandfather     No Known Problems Brother     Heart Defect Mother     Cancer Maternal Grandmother     Hypertension Father     Diabetes Father     High Cholesterol Mother     Hypertension Mother     High Cholesterol Father         Social History     Socioeconomic History    Marital status:      Spouse name: None    Number of children: None    Years of education: None    Highest education level: None   Tobacco Use    Smoking status: Never    Smokeless tobacco: Never   Substance and Sexual Activity    Alcohol use: No    Drug use: No   Social History Narrative    Live at home with . Step daughter that comes every other weekend.          Metformin     Discharge Medication List as of 9/4/2022  1:30 PM        CONTINUE these medications which have NOT CHANGED    Details   Insulin Degludec (TRESIBA FLEXTOUCH) 200 UNIT/ML SOPN Inject 64 Units into the skin daily, Disp-15 pen, R-1Normal      lisinopril (PRINIVIL;ZESTRIL) 10 MG tablet Take 1 tablet by mouth daily, Disp-90 tablet, R-1Normal      pantoprazole (PROTONIX) 40 MG tablet Take 1 tablet by mouth daily TAKE ONE TABLET BY MOUTH ONCE DAILY FOR GASTROESOPHAGEAL REFLUX, Disp-30 tablet, R-2Normal      rosuvastatin (CRESTOR) 40 MG tablet Take 1 tablet by mouth daily, Disp-90 tablet, R-1Normal      Semaglutide,0.25 or 0.5MG/DOS, (OZEMPIC, 0.25 OR 0.5 MG/DOSE,) 2 MG/1.5ML SOPN Inject 0.5 mg into the skin every 7 days, Disp-9 pen, R-5Normal      desvenlafaxine succinate (PRISTIQ) 100 MG TB24 extended release tablet Take 1 tablet by mouth daily, Disp-90 tablet, R-1Normal      levothyroxine (SYNTHROID) 112 MCG tablet Take 1 tablet by mouth every morning (before breakfast), Disp-90 tablet, R-1Normal      CPAP Machine MISC Historical Med      Probiotic Acidophilus (FLORANEX) TABS Take by mouthHistorical Med      ARIPiprazole (ABILIFY) 5 MG tablet Take 1 tablet by mouth daily, Disp-90 tablet, R-1Normal      Insulin Pen Needle 31G X 5 MM MISC DAILY Starting Fri 6/10/2022, Disp-100 each, R-5, Normal      ascorbic acid (VITAMIN C) 500 MG tablet Take by mouthHistorical Med      calcium carbonate 1500 (600 Ca) MG TABS tablet Take 600 mg by mouth 2 times dailyHistorical Med      Cholecalciferol 50 MCG (2000 UT) TABS Take 10,000 Int'l Units by mouthHistorical Med      estrogens, conjugated,-methylTESTOSTERone (ESTRATEST HS) 0.625-1.25 MG per tablet Take 1 tablet by mouth daily. Historical Med      fluticasone (FLONASE) 50 MCG/ACT nasal spray 2 sprays by Nasal route dailyHistorical Med      hydrocortisone valerate (WEST-BRAYAN) 0.2 % ointment Apply topically 2 times daily, Topical, 2 TIMES DAILY Starting Mon 11/8/2021, Historical Med      loratadine (CLARITIN) 10 MG tablet Take 10 mg by mouth daily as neededHistorical Med      naphazoline-pheniramine (OPCON-A) 0.027-0.315 % SOLN Apply 1 drop to eye 4 times daily as neededHistorical Med      ondansetron (ZOFRAN-ODT) 4 MG disintegrating tablet Take 4 mg by mouth every 8 hours as neededHistorical Med      valACYclovir (VALTREX) 1 g tablet Take 1,000 mg by mouth 2 times dailyHistorical Med              Vitals signs and nursing note reviewed. Patient Vitals for the past 4 hrs:   Temp Pulse Resp BP SpO2   09/04/22 1212 98 °F (36.7 °C) 98 18 (!) 144/103 98 %          Physical Exam  Vitals and nursing note reviewed. Constitutional:       General: She is not in acute distress. Appearance: Normal appearance. She is obese. She is not ill-appearing, toxic-appearing or diaphoretic. HENT:      Head: Normocephalic and atraumatic.       Nose: Nose normal.      Mouth/Throat:      Mouth: Mucous membranes are moist. Thought Content: Thought content normal.         Judgment: Judgment normal.        Procedures      [unfilled]     XR KNEE LEFT (3 VIEWS)   Final Result   1. Mild degenerative changes of the medial compartment otherwise unremarkable   left knee radiographs. ED Course as of 09/04/22 1335   Sun Sep 04, 2022   1323 XR KNEE LEFT (3 VIEWS)  FINDINGS:  There is no acute fracture or dislocation. Bony mineralization is preserved. The  joint spaces are maintained and articular surfaces are intact. Mild degenerative  changes at the medial compartment There is no joint effusion. The soft tissues  are unremarkable. IMPRESSION:  1. Mild degenerative changes of the medial compartment otherwise unremarkable  left knee radiographs. [JG]      ED Course User Index  [JG] JEFFERY Guido        Voice dictation software was used during the making of this note. This software is not perfect and grammatical and other typographical errors may be present. This note has not been completely proofread for errors.       Iliana Guido  09/04/22 0704

## 2022-09-04 NOTE — ED TRIAGE NOTES
Patient presents with complaints of left knee pain. Patient states pain x4 years. Patient states 2 weeks ago patient ws at work when she states \"it went out\" patient states that she was seen on Thursday and had injection and worsening pain. Denies any new injury.

## 2022-09-04 NOTE — DISCHARGE INSTRUCTIONS
You were evaluated in the emergency department today for worsening of chronic left knee pain  X-rays today are negative for any acute bony abnormality, fracture, dislocation    Physical exam is also consistent with piriformis syndrome on your left side. I have included some stretches that you can perform. Perform stretches as tolerated    I have written you a prescription for Robaxin. This is a muscle relaxer. Take caution I have written you for until you understand how this medication affects you. I wrote your prescription for naproxen. This is an NSAID. Take this medication with food. Stop taking this medication if you develop stomach pain or black and tarry stools.   Do not take other NSAIDs such as ibuprofen, Mobic with this medication    Follow instructions for RICE    You were given crutches and a knee immobilizer to help with knee pain bear weight as tolerated    I have given you contact information for [place] [facility] to try to schedule an appointment at an earlier date versus Lizandro Sanchez your primary care provider regarding work restrictions    Return to the emergency department you develop redness, warmth, fever around the joint, systemic fever or chills, general worsening of your condition

## 2022-09-07 ENCOUNTER — HOSPITAL ENCOUNTER (OUTPATIENT)
Dept: LAB | Age: 57
Discharge: HOME | End: 2022-09-10

## 2022-09-07 PROCEDURE — 88305 TISSUE EXAM BY PATHOLOGIST: CPT

## 2022-09-07 PROCEDURE — 88312 SPECIAL STAINS GROUP 1: CPT

## 2022-10-04 ENCOUNTER — PATIENT MESSAGE (OUTPATIENT)
Dept: FAMILY MEDICINE CLINIC | Facility: CLINIC | Age: 57
End: 2022-10-04

## 2022-10-04 NOTE — LETTER
[facility]  [address] [facility] [address]  Phone: [phone]  Fax: [address] [facility]        October 6, 2022     Patient: Nicole Hancock   YOB: 1965   Date of Visit: 10/4/2022       To Whom it May Concern:    Marya Benton was seen in my clinic on 10/4/2022. She may return to work on 10/11/2022  She may return to work with  8 hours daily, 40 hours weekly  Scheduled day off weekly. If you have any questions or concerns, please don't hesitate to call. Sincerely,         Clemente Esparza.  Og Aceves PA-C

## 2022-10-04 NOTE — LETTER
[facility]  [id number] [place] [address]  Phone: [phone]  Fax: [address] [facility] [place]        October 4, 2022     Patient: Xiao Mai   YOB: 1965   Date of Visit: 10/4/2022       To Whom it May Concern:    Digna Jimenez was seen in my clinic on 10/4/2022. She may return to work on 10/4/22     Please limit hours to 8 hours daily and 40 hours weekly and allow  Scheduled day off weekly. To limit re flair of pain  . If you have any questions or concerns, please don't hesitate to call. Sincerely,         Natalie Wilde.  Bridgette Perez PA-C

## 2022-10-04 NOTE — TELEPHONE ENCOUNTER
From: Marilu Penaloza  To: Peterjulio Ledezma  Sent: 10/4/2022 10:49 AM EDT  Subject: Work letter    Antonia Boys,   I was wondering if you would write my letter for 8 hours only for work. If you prefer, I can come in for a visit. My knee is much better, but we are in our busiest season from now until the end of the year. If you will write it please include the followin hours daily  40 hours weekly  Scheduled day off weekly  Management is hard to get along with at times and things must be spelled out. Let me know what I must do, come in or not. I go back to work next Tuesday. Thanks for your help.

## 2022-12-12 ENCOUNTER — PATIENT MESSAGE (OUTPATIENT)
Dept: FAMILY MEDICINE CLINIC | Facility: CLINIC | Age: 57
End: 2022-12-12

## 2022-12-13 RX ORDER — ARIPIPRAZOLE 10 MG/1
10 TABLET ORAL DAILY
Qty: 90 TABLET | Refills: 1 | Status: SHIPPED | OUTPATIENT
Start: 2022-12-13

## 2022-12-13 NOTE — TELEPHONE ENCOUNTER
From: Pawel Alexander  To: Xuan Pride  Sent: 12/12/2022 3:35 PM EST  Subject: Depression     Im struggling again. No interest in anything, sleeping 10 hours, or more. Have to make myself do anything. ..shower, work, go anywhere. Just need some help living. Im simply surviving at this point.

## 2022-12-15 ENCOUNTER — OFFICE VISIT (OUTPATIENT)
Dept: FAMILY MEDICINE CLINIC | Facility: CLINIC | Age: 57
End: 2022-12-15
Payer: COMMERCIAL

## 2022-12-15 VITALS
BODY MASS INDEX: 35.62 KG/M2 | HEART RATE: 89 BPM | WEIGHT: 221.6 LBS | TEMPERATURE: 98.4 F | HEIGHT: 66 IN | DIASTOLIC BLOOD PRESSURE: 80 MMHG | OXYGEN SATURATION: 97 % | SYSTOLIC BLOOD PRESSURE: 116 MMHG

## 2022-12-15 DIAGNOSIS — E11.69 HYPERLIPIDEMIA ASSOCIATED WITH TYPE 2 DIABETES MELLITUS (HCC): ICD-10-CM

## 2022-12-15 DIAGNOSIS — Z79.4 TYPE 2 DIABETES MELLITUS WITHOUT COMPLICATION, WITH LONG-TERM CURRENT USE OF INSULIN (HCC): ICD-10-CM

## 2022-12-15 DIAGNOSIS — E03.9 ACQUIRED HYPOTHYROIDISM: Primary | ICD-10-CM

## 2022-12-15 DIAGNOSIS — E03.9 ACQUIRED HYPOTHYROIDISM: ICD-10-CM

## 2022-12-15 DIAGNOSIS — K21.9 GASTROESOPHAGEAL REFLUX DISEASE WITHOUT ESOPHAGITIS: ICD-10-CM

## 2022-12-15 DIAGNOSIS — I10 ESSENTIAL HYPERTENSION WITH GOAL BLOOD PRESSURE LESS THAN 130/80: ICD-10-CM

## 2022-12-15 DIAGNOSIS — E11.9 TYPE 2 DIABETES MELLITUS WITHOUT COMPLICATION, WITH LONG-TERM CURRENT USE OF INSULIN (HCC): ICD-10-CM

## 2022-12-15 DIAGNOSIS — E78.5 HYPERLIPIDEMIA ASSOCIATED WITH TYPE 2 DIABETES MELLITUS (HCC): ICD-10-CM

## 2022-12-15 DIAGNOSIS — E78.2 MIXED HYPERLIPIDEMIA: ICD-10-CM

## 2022-12-15 LAB
ALBUMIN SERPL-MCNC: 3.9 G/DL (ref 3.5–5)
ALBUMIN/GLOB SERPL: 1.1 (ref 0.4–1.6)
ALP SERPL-CCNC: 60 U/L (ref 50–136)
ALT SERPL-CCNC: 50 U/L (ref 12–65)
ANION GAP SERPL CALC-SCNC: 4 MMOL/L (ref 2–11)
APPEARANCE UR: CLEAR
AST SERPL-CCNC: 38 U/L (ref 15–37)
BILIRUB SERPL-MCNC: 0.5 MG/DL (ref 0.2–1.1)
BILIRUB UR QL: NEGATIVE
BUN SERPL-MCNC: 10 MG/DL (ref 6–23)
CALCIUM SERPL-MCNC: 9.1 MG/DL (ref 8.3–10.4)
CHLORIDE SERPL-SCNC: 104 MMOL/L (ref 101–110)
CHOLEST SERPL-MCNC: 143 MG/DL
CO2 SERPL-SCNC: 30 MMOL/L (ref 21–32)
COLOR UR: NORMAL
CREAT SERPL-MCNC: 0.9 MG/DL (ref 0.6–1)
CREAT UR-MCNC: 61 MG/DL
EST. AVERAGE GLUCOSE BLD GHB EST-MCNC: 157 MG/DL
GLOBULIN SER CALC-MCNC: 3.5 G/DL (ref 2.8–4.5)
GLUCOSE SERPL-MCNC: 105 MG/DL (ref 65–100)
GLUCOSE UR STRIP.AUTO-MCNC: NEGATIVE MG/DL
HBA1C MFR BLD: 7.1 % (ref 4.8–5.6)
HDLC SERPL-MCNC: 49 MG/DL (ref 40–60)
HDLC SERPL: 2.9
HGB UR QL STRIP: NEGATIVE
KETONES UR QL STRIP.AUTO: NEGATIVE MG/DL
LDLC SERPL CALC-MCNC: 63.8 MG/DL
LEUKOCYTE ESTERASE UR QL STRIP.AUTO: NEGATIVE
MICROALBUMIN UR-MCNC: <0.5 MG/DL (ref 0–3)
MICROALBUMIN/CREAT UR-RTO: NORMAL MG/G (ref 0–30)
NITRITE UR QL STRIP.AUTO: NEGATIVE
PH UR STRIP: 5 (ref 5–9)
POTASSIUM SERPL-SCNC: 4.3 MMOL/L (ref 3.5–5.1)
PROT SERPL-MCNC: 7.4 G/DL (ref 6.3–8.2)
PROT UR STRIP-MCNC: NEGATIVE MG/DL
SODIUM SERPL-SCNC: 138 MMOL/L (ref 133–143)
SP GR UR REFRACTOMETRY: 1.01 (ref 1–1.02)
T4 FREE SERPL-MCNC: 1.3 NG/DL (ref 0.78–1.46)
TRIGL SERPL-MCNC: 151 MG/DL (ref 35–150)
TSH, 3RD GENERATION: 2.02 UIU/ML (ref 0.36–3.74)
UROBILINOGEN UR QL STRIP.AUTO: 0.2 EU/DL (ref 0.2–1)
VLDLC SERPL CALC-MCNC: 30.2 MG/DL (ref 6–23)

## 2022-12-15 PROCEDURE — 3078F DIAST BP <80 MM HG: CPT | Performed by: PHYSICIAN ASSISTANT

## 2022-12-15 PROCEDURE — 3074F SYST BP LT 130 MM HG: CPT | Performed by: PHYSICIAN ASSISTANT

## 2022-12-15 PROCEDURE — 99214 OFFICE O/P EST MOD 30 MIN: CPT | Performed by: PHYSICIAN ASSISTANT

## 2022-12-15 PROCEDURE — 3051F HG A1C>EQUAL 7.0%<8.0%: CPT | Performed by: PHYSICIAN ASSISTANT

## 2022-12-15 RX ORDER — SEMAGLUTIDE 1.34 MG/ML
0.5 INJECTION, SOLUTION SUBCUTANEOUS
Qty: 9 ADJUSTABLE DOSE PRE-FILLED PEN SYRINGE | Refills: 1 | Status: SHIPPED | OUTPATIENT
Start: 2022-12-15

## 2022-12-15 RX ORDER — PANTOPRAZOLE SODIUM 40 MG/1
40 TABLET, DELAYED RELEASE ORAL DAILY
Qty: 30 TABLET | Refills: 2 | Status: SHIPPED | OUTPATIENT
Start: 2022-12-15

## 2022-12-15 RX ORDER — DESVENLAFAXINE 50 MG/1
50 TABLET, EXTENDED RELEASE ORAL DAILY
Qty: 30 TABLET | Refills: 0 | Status: SHIPPED | OUTPATIENT
Start: 2022-12-15

## 2022-12-15 RX ORDER — ROSUVASTATIN CALCIUM 40 MG/1
40 TABLET, COATED ORAL DAILY
Qty: 90 TABLET | Refills: 1 | Status: SHIPPED | OUTPATIENT
Start: 2022-12-15

## 2022-12-15 RX ORDER — LISINOPRIL 10 MG/1
10 TABLET ORAL DAILY
Qty: 90 TABLET | Refills: 1 | Status: SHIPPED | OUTPATIENT
Start: 2022-12-15

## 2022-12-15 RX ORDER — LEVOTHYROXINE SODIUM 112 UG/1
112 TABLET ORAL
Qty: 90 TABLET | Refills: 1 | Status: SHIPPED | OUTPATIENT
Start: 2022-12-15

## 2022-12-15 RX ORDER — INSULIN DEGLUDEC 200 U/ML
64 INJECTION, SOLUTION SUBCUTANEOUS DAILY
Qty: 15 ADJUSTABLE DOSE PRE-FILLED PEN SYRINGE | Refills: 1 | Status: SHIPPED | OUTPATIENT
Start: 2022-12-15

## 2022-12-15 ASSESSMENT — PATIENT HEALTH QUESTIONNAIRE - PHQ9
4. FEELING TIRED OR HAVING LITTLE ENERGY: 3
SUM OF ALL RESPONSES TO PHQ QUESTIONS 1-9: 19
1. LITTLE INTEREST OR PLEASURE IN DOING THINGS: 3
3. TROUBLE FALLING OR STAYING ASLEEP: 3
5. POOR APPETITE OR OVEREATING: 0
9. THOUGHTS THAT YOU WOULD BE BETTER OFF DEAD, OR OF HURTING YOURSELF: 0
7. TROUBLE CONCENTRATING ON THINGS, SUCH AS READING THE NEWSPAPER OR WATCHING TELEVISION: 3
6. FEELING BAD ABOUT YOURSELF - OR THAT YOU ARE A FAILURE OR HAVE LET YOURSELF OR YOUR FAMILY DOWN: 2
8. MOVING OR SPEAKING SO SLOWLY THAT OTHER PEOPLE COULD HAVE NOTICED. OR THE OPPOSITE, BEING SO FIGETY OR RESTLESS THAT YOU HAVE BEEN MOVING AROUND A LOT MORE THAN USUAL: 2
SUM OF ALL RESPONSES TO PHQ QUESTIONS 1-9: 19
SUM OF ALL RESPONSES TO PHQ QUESTIONS 1-9: 19
2. FEELING DOWN, DEPRESSED OR HOPELESS: 3
SUM OF ALL RESPONSES TO PHQ9 QUESTIONS 1 & 2: 6
10. IF YOU CHECKED OFF ANY PROBLEMS, HOW DIFFICULT HAVE THESE PROBLEMS MADE IT FOR YOU TO DO YOUR WORK, TAKE CARE OF THINGS AT HOME, OR GET ALONG WITH OTHER PEOPLE: 3
SUM OF ALL RESPONSES TO PHQ QUESTIONS 1-9: 19

## 2022-12-15 NOTE — PROGRESS NOTES
[address] [place] [address]  Phone [phone]  Fax [phone]  Shira GIL    Patient: Nnamdi Liao  YOB: 1965  Patient Age 61 y.o. Patient sex: female  Medical Record:  [id number]  Visit Date:12/15/2022   Author:  Concetta Royal. [facility] Clinic Note     Chief complaint  Nnamdi Liao  is a 61 y.o. female who was seen on 12/15/22  9:17 AM  for the following reasons:    Chief Complaint   Patient presents with    Follow-up     Depression---sleeping 10-12 hrs a day,  not working this week, not keeping up with household       Current Medical problems addressed    Depression   Has been worse. She has been on trintellix in the past and it did well on it but was a cost so she has been on abilify and didn't get the message to switch to 10mg. . she does want to change her medications. Hypertension  Stable on meds  Gerd stable on meds   Diabetes  Lab Results   Component Value Date    LABA1C 7.1 (H) 12/15/2022    LABA1C 7.0 (H) 05/27/2022    LABA1C 7.0 (H) 11/17/2021     Lab Results   Component Value Date    LABMICR <0.50 12/15/2022    LDLCALC 63.8 12/15/2022    CREATININE 0.90 12/15/2022     Due for labs and notes the diet and exercise she has not been doing well as she has been unmotivated but wants to change that prior to changing meds and is due for labs in the next few weeks. ASSESSMENT AND PLAN    ICD-10-CM    1. Essential hypertension with goal blood pressure less than 130/80  I10 lisinopril (PRINIVIL;ZESTRIL) 10 MG tablet     pantoprazole (PROTONIX) 40 MG tablet     rosuvastatin (CRESTOR) 40 MG tablet      2. Hyperlipidemia associated with type 2 diabetes mellitus (HCC)  E11.69 lisinopril (PRINIVIL;ZESTRIL) 10 MG tablet    E78.5 pantoprazole (PROTONIX) 40 MG tablet     rosuvastatin (CRESTOR) 40 MG tablet      3.  Gastroesophageal reflux disease without esophagitis  K21.9 lisinopril (PRINIVIL;ZESTRIL) 10 MG tablet     pantoprazole (PROTONIX) 40 MG tablet     rosuvastatin (CRESTOR) 40 MG tablet         Suha was seen today for follow-up. Diagnoses and all orders for this visit:    Essential hypertension with goal blood pressure less than 130/80  -     lisinopril (PRINIVIL;ZESTRIL) 10 MG tablet; Take 1 tablet by mouth daily  -     pantoprazole (PROTONIX) 40 MG tablet; Take 1 tablet by mouth daily TAKE ONE TABLET BY MOUTH ONCE DAILY FOR GASTROESOPHAGEAL REFLUX  -     rosuvastatin (CRESTOR) 40 MG tablet; Take 1 tablet by mouth daily    Hyperlipidemia associated with type 2 diabetes mellitus (HCC)  -     lisinopril (PRINIVIL;ZESTRIL) 10 MG tablet; Take 1 tablet by mouth daily  -     pantoprazole (PROTONIX) 40 MG tablet; Take 1 tablet by mouth daily TAKE ONE TABLET BY MOUTH ONCE DAILY FOR GASTROESOPHAGEAL REFLUX  -     rosuvastatin (CRESTOR) 40 MG tablet; Take 1 tablet by mouth daily    Gastroesophageal reflux disease without esophagitis  -     lisinopril (PRINIVIL;ZESTRIL) 10 MG tablet; Take 1 tablet by mouth daily  -     pantoprazole (PROTONIX) 40 MG tablet; Take 1 tablet by mouth daily TAKE ONE TABLET BY MOUTH ONCE DAILY FOR GASTROESOPHAGEAL REFLUX  -     rosuvastatin (CRESTOR) 40 MG tablet; Take 1 tablet by mouth daily    Other orders  -     desvenlafaxine succinate (PRISTIQ) 50 MG TB24 extended release tablet; Take 1 tablet by mouth daily  -     VORTIoxetine (TRINTELLIX) 10 MG TABS tablet; Take 1 tablet by mouth daily  -     Insulin Degludec (TRESIBA FLEXTOUCH) 200 UNIT/ML SOPN; Inject 64 Units into the skin daily  -     levothyroxine (SYNTHROID) 112 MCG tablet;  Take 1 tablet by mouth every morning (before breakfast)  -     Semaglutide,0.25 or 0.5MG/DOS, (OZEMPIC, 0.25 OR 0.5 MG/DOSE,) 2 MG/1.5ML SOPN; Inject 0.5 mg into the skin every 7 days  -     Insulin Pen Needle 31G X 5 MM MISC; 1 each by Other route daily    Plan includes the following:  Patient last worked  Dec 9th - missed first day on dec 10hth needs fmla/paperwork for being out while transitioning meds due to worsening depression   No follow-up provider specified. No orders of the defined types were placed in this encounter. Past Medical History: Allergies: Allergies   Allergen Reactions    Metformin Diarrhea       Current Medications:   Medications marked \"taking\" at this time:  Current Outpatient Medications   Medication Sig Dispense Refill    desvenlafaxine succinate (PRISTIQ) 50 MG TB24 extended release tablet Take 1 tablet by mouth daily 30 tablet 0    VORTIoxetine (TRINTELLIX) 10 MG TABS tablet Take 1 tablet by mouth daily 30 tablet 2    Insulin Degludec (TRESIBA FLEXTOUCH) 200 UNIT/ML SOPN Inject 64 Units into the skin daily 15 Adjustable Dose Pre-filled Pen Syringe 1    levothyroxine (SYNTHROID) 112 MCG tablet Take 1 tablet by mouth every morning (before breakfast) 90 tablet 1    lisinopril (PRINIVIL;ZESTRIL) 10 MG tablet Take 1 tablet by mouth daily 90 tablet 1    pantoprazole (PROTONIX) 40 MG tablet Take 1 tablet by mouth daily TAKE ONE TABLET BY MOUTH ONCE DAILY FOR GASTROESOPHAGEAL REFLUX 30 tablet 2    rosuvastatin (CRESTOR) 40 MG tablet Take 1 tablet by mouth daily 90 tablet 1    Semaglutide,0.25 or 0.5MG/DOS, (OZEMPIC, 0.25 OR 0.5 MG/DOSE,) 2 MG/1.5ML SOPN Inject 0.5 mg into the skin every 7 days 9 Adjustable Dose Pre-filled Pen Syringe 1    Insulin Pen Needle 31G X 5 MM MISC 1 each by Other route daily 100 each 5    CPAP Machine MISC by Other route      ARIPiprazole (ABILIFY) 5 MG tablet Take 1 tablet by mouth daily 90 tablet 1    ascorbic acid (VITAMIN C) 500 MG tablet Take by mouth      calcium carbonate 1500 (600 Ca) MG TABS tablet Take 600 mg by mouth 2 times daily      Cholecalciferol 50 MCG (2000 UT) TABS Take 10,000 Int'l Units by mouth      estrogens, conjugated,-methylTESTOSTERone (ESTRATEST HS) 0.625-1.25 MG per tablet Take 1 tablet by mouth daily.       fluticasone (FLONASE) 50 MCG/ACT nasal spray 2 sprays by Nasal route daily      hydrocortisone valerate (WEST-BRAYAN) 0.2 % ointment Apply topically 2 times daily      ondansetron (ZOFRAN-ODT) 4 MG disintegrating tablet Take 4 mg by mouth every 8 hours as needed      valACYclovir (VALTREX) 1 g tablet Take 1,000 mg by mouth 2 times daily      ARIPiprazole (ABILIFY) 10 MG tablet Take 1 tablet by mouth daily (Patient not taking: Reported on 12/15/2022) 90 tablet 1    naproxen (NAPROSYN) 500 MG tablet Take 1 tablet by mouth 2 times daily for 7 days 14 tablet 0    Probiotic Acidophilus (FLORANEX) TABS Take by mouth (Patient not taking: Reported on 12/15/2022)      loratadine (CLARITIN) 10 MG tablet Take 10 mg by mouth daily as needed (Patient not taking: Reported on 12/15/2022)      naphazoline-pheniramine (OPCON-A) 0.027-0.315 % SOLN Apply 1 drop to eye 4 times daily as needed (Patient not taking: Reported on 12/15/2022)       No current facility-administered medications for this visit.         Current Problem List:   Patient Active Problem List   Diagnosis    Hyperlipidemia associated with type 2 diabetes mellitus ([address]    Essential hypertension with goal blood pressure less than 130/80    Acquired hypothyroidism    Family history of hyperlipidemia    Anxiety    Severe obesity ([address]    Family history of ischemic heart disease    Hypocalcemia    HSV infection    Vaginal dysplasia    Type 2 diabetes mellitus without complication, with long-term current use of insulin ([address]    History of left breast cancer    Severe episode of recurrent major depressive disorder, without psychotic features ([address]    Family history of sudden cardiac death in brother    Current moderate episode of major depressive disorder ([address]    Long-term insulin use ([facility])    Gastroesophageal reflux disease without esophagitis    RAFA (obstructive sleep apnea)       Social History:   Social History     Tobacco Use    Smoking status: Never    Smokeless tobacco: Never   Substance Use Topics    Alcohol use: No       Family History:   Family History   Problem Relation Age of Onset    Heart Attack Maternal Grandfather     Diabetes Paternal Grandmother     Diabetes Paternal Grandfather     No Known Problems Brother     Heart Defect Mother     Cancer Maternal Grandmother     Hypertension Father     Diabetes Father     High Cholesterol Mother     Hypertension Mother     High Cholesterol Father        Surgical History:  Past Surgical History:   Procedure Laterality Date    BREAST LUMPECTOMY Left 1994    CHOLECYSTECTOMY      2008    COLONOSCOPY N/A 6/13/2017    Jose--sigmoid hyperplastic polyp--5 year recall    COLONOSCOPY  2008    Dr Angela Xiong (CERVIX STATUS UNKNOWN)  1993    THYROIDECTOMY  2012       ROS  Review of Systems    /80   Pulse 89   Temp 98.4 °F (36.9 °C)   Ht 5' 6\" (1.676 m)   Wt 221 lb 9.6 oz (100.5 kg)   SpO2 97%   BMI 35.77 kg/m²   Body mass index is 35.77 kg/m². Physical Exam    Physical Exam     I have reviewed the patient's past medical history, social history and family history and vitals. We have discussed treatment plan and follow up and given patient instructions. Patient's questions are answered and we will follow up as indicated. Return in about 7 weeks (around 2/1/2023). Karyn Rivas PA-C  9:17 AM  12/15/2022

## 2022-12-15 NOTE — PATIENT INSTRUCTIONS
Change to pristiq 50mg for 2 weeks  Then switch to trintellix 5mg daily for two weesk (has sample)  Then switch to trintellix 10mg   Stay on abilify 10mg until follow up apt   Get labs at outpatient [facility]

## 2023-02-03 ENCOUNTER — OFFICE VISIT (OUTPATIENT)
Dept: FAMILY MEDICINE CLINIC | Facility: CLINIC | Age: 58
End: 2023-02-03
Payer: COMMERCIAL

## 2023-02-03 VITALS
HEIGHT: 66 IN | OXYGEN SATURATION: 96 % | HEART RATE: 94 BPM | SYSTOLIC BLOOD PRESSURE: 106 MMHG | DIASTOLIC BLOOD PRESSURE: 88 MMHG | BODY MASS INDEX: 35.52 KG/M2 | TEMPERATURE: 98 F | WEIGHT: 221 LBS

## 2023-02-03 DIAGNOSIS — F33.1 MODERATE EPISODE OF RECURRENT MAJOR DEPRESSIVE DISORDER (HCC): ICD-10-CM

## 2023-02-03 DIAGNOSIS — E11.69 TYPE 2 DIABETES MELLITUS WITH OTHER SPECIFIED COMPLICATION, WITH LONG-TERM CURRENT USE OF INSULIN (HCC): Primary | ICD-10-CM

## 2023-02-03 DIAGNOSIS — Z79.4 TYPE 2 DIABETES MELLITUS WITH OTHER SPECIFIED COMPLICATION, WITH LONG-TERM CURRENT USE OF INSULIN (HCC): Primary | ICD-10-CM

## 2023-02-03 PROCEDURE — 3078F DIAST BP <80 MM HG: CPT | Performed by: PHYSICIAN ASSISTANT

## 2023-02-03 PROCEDURE — 3074F SYST BP LT 130 MM HG: CPT | Performed by: PHYSICIAN ASSISTANT

## 2023-02-03 PROCEDURE — 99214 OFFICE O/P EST MOD 30 MIN: CPT | Performed by: PHYSICIAN ASSISTANT

## 2023-02-03 RX ORDER — SEMAGLUTIDE 1.34 MG/ML
1 INJECTION, SOLUTION SUBCUTANEOUS WEEKLY
Qty: 3 ML | Refills: 3 | Status: SHIPPED | OUTPATIENT
Start: 2023-02-03

## 2023-02-03 ASSESSMENT — PATIENT HEALTH QUESTIONNAIRE - PHQ9
1. LITTLE INTEREST OR PLEASURE IN DOING THINGS: 3
SUM OF ALL RESPONSES TO PHQ9 QUESTIONS 1 & 2: 4
2. FEELING DOWN, DEPRESSED OR HOPELESS: 1
SUM OF ALL RESPONSES TO PHQ QUESTIONS 1-9: 4

## 2023-02-03 ASSESSMENT — ENCOUNTER SYMPTOMS: SHORTNESS OF BREATH: 0

## 2023-02-03 NOTE — PROGRESS NOTES
[address] [address]  Phone [phone]  Fax [phone]  Susan GIL    Patient: Portillo Jorge  YOB: 1965  Patient Age 61 y.o. Patient sex: female  Medical Record:  [id number]  Visit Date:2/3/2023   Author:  Mortimer Laurel. Kala Tena    [facility] Clinic Note     Chief complaint  Portillo Jorge  is a 61 y.o. female who was seen on 02/03/23  9:29 AM  for the following reasons:    Chief Complaint   Patient presents with    Follow-up    Depression     Restless, sleeping a lot,  \"feels null and void\"       Current Medical problems addressed    Depression  Still feeling void - no emotional response. She is praying to get through the day and work does keep her mind busy but is tired. She denies any suicidal thoughts  If she is worse se calls in . She has felt restless at times too. ASSESSMENT AND PLAN    ICD-10-CM    1. Type 2 diabetes mellitus with other specified complication, with long-term current use of insulin ([facility])  E11.69     Z79.4       2. Moderate episode of recurrent major depressive disorder ([facility])  F33.1          Suha was seen today for follow-up and depression. Diagnoses and all orders for this visit:    Type 2 diabetes mellitus with other specified complication, with long-term current use of insulin ([facility])    Moderate episode of recurrent major depressive disorder ([facility] [address]    Other orders  -     Discontinue: VORTIoxetine HBr (TRINTELLIX) 20 MG TABS tablet; Take 1 tablet by mouth daily  -     Semaglutide, 1 MG/DOSE, (OZEMPIC, 1 MG/DOSE,) 4 MG/3ML SOPN; Inject 1 mg into the skin once a week  -     VORTIoxetine HBr (TRINTELLIX) 20 MG TABS tablet; Take 1 tablet by mouth daily    Plan includes the following:  Increased trintellix  Monitor sugars and increase ozempic to 1mg   No follow-up provider specified. No orders of the defined types were placed in this encounter. Past Medical History: Allergies:   Allergies   Allergen Reactions  Metformin Diarrhea       Current Medications:   Medications marked \"taking\" at this time:  Current Outpatient Medications   Medication Sig Dispense Refill    Semaglutide, 1 MG/DOSE, (OZEMPIC, 1 MG/DOSE,) 4 MG/3ML SOPN Inject 1 mg into the skin once a week 3 mL 3    VORTIoxetine HBr (TRINTELLIX) 20 MG TABS tablet Take 1 tablet by mouth daily 90 tablet 1    Insulin Degludec (TRESIBA FLEXTOUCH) 200 UNIT/ML SOPN Inject 64 Units into the skin daily 15 Adjustable Dose Pre-filled Pen Syringe 1    levothyroxine (SYNTHROID) 112 MCG tablet Take 1 tablet by mouth every morning (before breakfast) 90 tablet 1    lisinopril (PRINIVIL;ZESTRIL) 10 MG tablet Take 1 tablet by mouth daily 90 tablet 1    pantoprazole (PROTONIX) 40 MG tablet Take 1 tablet by mouth daily TAKE ONE TABLET BY MOUTH ONCE DAILY FOR GASTROESOPHAGEAL REFLUX 30 tablet 2    rosuvastatin (CRESTOR) 40 MG tablet Take 1 tablet by mouth daily 90 tablet 1    Semaglutide,0.25 or 0.5MG/DOS, (OZEMPIC, 0.25 OR 0.5 MG/DOSE,) 2 MG/1.5ML SOPN Inject 0.5 mg into the skin every 7 days 9 Adjustable Dose Pre-filled Pen Syringe 1    Insulin Pen Needle 31G X 5 MM MISC 1 each by Other route daily 100 each 5    ARIPiprazole (ABILIFY) 10 MG tablet Take 1 tablet by mouth daily 90 tablet 1    CPAP Machine MISC by Other route      Probiotic Acidophilus (FLORANEX) TABS Take by mouth      ascorbic acid (VITAMIN C) 500 MG tablet Take by mouth      calcium carbonate 1500 (600 Ca) MG TABS tablet Take 600 mg by mouth 2 times daily      Cholecalciferol 50 MCG (2000 UT) TABS Take 10,000 Int'l Units by mouth      estrogens, conjugated,-methylTESTOSTERone (ESTRATEST HS) 0.625-1.25 MG per tablet Take 1 tablet by mouth daily.       fluticasone (FLONASE) 50 MCG/ACT nasal spray 2 sprays by Nasal route daily      hydrocortisone valerate (WEST-BRAYAN) 0.2 % ointment Apply topically 2 times daily      loratadine (CLARITIN) 10 MG tablet Take 10 mg by mouth daily as needed      naphazoline-pheniramine (OPCON-A) 0.027-0.315 % SOLN Apply 1 drop to eye 4 times daily as needed      ondansetron (ZOFRAN-ODT) 4 MG disintegrating tablet Take 4 mg by mouth every 8 hours as needed      valACYclovir (VALTREX) 1 g tablet Take 1,000 mg by mouth 2 times daily      naproxen (NAPROSYN) 500 MG tablet Take 1 tablet by mouth 2 times daily for 7 days 14 tablet 0     No current facility-administered medications for this visit.         Current Problem List:   Patient Active Problem List   Diagnosis    Hyperlipidemia associated with type 2 diabetes mellitus ([facility] [address]    Essential hypertension with goal blood pressure less than 130/80    Acquired hypothyroidism    Family history of hyperlipidemia    Anxiety    Severe obesity ([facility] [address]    Family history of ischemic heart disease    Hypocalcemia    HSV infection    Vaginal dysplasia    Type 2 diabetes mellitus without complication, with long-term current use of insulin ([facility] [address]    History of left breast cancer    Severe episode of recurrent major depressive disorder, without psychotic features ([facility] [address]    Family history of sudden cardiac death in brother    Current moderate episode of major depressive disorder ([facility] [address]    Long-term insulin use ([facility] [address]    Gastroesophageal reflux disease without esophagitis    RAFA (obstructive sleep apnea)       Social History:   Social History     Tobacco Use    Smoking status: Never    Smokeless tobacco: Never   Substance Use Topics    Alcohol use: No       Family History:   Family History   Problem Relation Age of Onset    Heart Attack Maternal Grandfather     Diabetes Paternal Grandmother     Diabetes Paternal Grandfather     No Known Problems Brother     Heart Defect Mother     Cancer Maternal Grandmother     Hypertension Father     Diabetes Father     High Cholesterol Mother     Hypertension Mother     High Cholesterol Father        Surgical History:  Past Surgical History:   Procedure Laterality Date    BREAST LUMPECTOMY Left 1994    CHOLECYSTECTOMY      2008    COLONOSCOPY N/A 6/13/2017    Jose--sigmoid hyperplastic polyp--5 year recall    COLONOSCOPY  2008    Dr Vamshi Melgar ([address])  1993    THYROIDECTOMY  2012       ROS  Review of Systems   Constitutional:  Negative for fever. Respiratory:  Negative for shortness of breath. Cardiovascular:  Negative for chest pain. Psychiatric/Behavioral:  Positive for dysphoric mood. Negative for self-injury, sleep disturbance and suicidal ideas. /88   Pulse 94   Temp 98 °F (36.7 °C)   Ht 5' 6\" (1.676 m)   Wt 221 lb (100.2 kg)   SpO2 96%   BMI 35.67 kg/m²   Body mass index is 35.67 kg/m². Physical Exam    Physical Exam  Vitals and nursing note reviewed. Constitutional:       Appearance: Normal appearance. Eyes:      Conjunctiva/sclera: Conjunctivae normal.   Cardiovascular:      Rate and Rhythm: Normal rate and regular rhythm. Pulmonary:      Effort: Pulmonary effort is normal.      Breath sounds: Normal breath sounds. Musculoskeletal:      Right lower leg: No edema. Left lower leg: No edema. Skin:     General: Skin is warm. Neurological:      Mental Status: She is alert and oriented to person, place, and time. Psychiatric:         Attention and Perception: Attention normal.         Mood and Affect: Mood normal. Affect is flat. Speech: Speech normal.         Behavior: Behavior is cooperative. Thought Content: Thought content normal.         Cognition and Memory: Cognition normal.         Judgment: Judgment normal.        I have reviewed the patient's past medical history, social history and family history and vitals. We have discussed treatment plan and follow up and given patient instructions. Patient's questions are answered and we will follow up as indicated. No follow-ups on file. Karyn Ogden PA-C  9:29 AM  2/3/2023

## 2023-02-13 ENCOUNTER — TELEPHONE (OUTPATIENT)
Dept: FAMILY MEDICINE CLINIC | Facility: CLINIC | Age: 58
End: 2023-02-13

## 2023-02-13 NOTE — TELEPHONE ENCOUNTER
[employer] needs prior authorization call [phone] option 1 to speak with a live rep. Call pt when this is complete.

## 2023-02-14 ENCOUNTER — PATIENT MESSAGE (OUTPATIENT)
Dept: FAMILY MEDICINE CLINIC | Facility: CLINIC | Age: 58
End: 2023-02-14

## 2023-02-20 RX ORDER — SEMAGLUTIDE 1.34 MG/ML
1 INJECTION, SOLUTION SUBCUTANEOUS WEEKLY
Qty: 9 ML | Refills: 3 | Status: SHIPPED | OUTPATIENT
Start: 2023-02-20

## 2023-02-20 NOTE — TELEPHONE ENCOUNTER
From: Stephanie Orr  To: Carmen Stiles  Sent: 2/14/2023 7:30 PM EST  Subject: Ozympic     Will you please resubmit the prescription to [facility] for the Ozympic for 3 months. The card charges the same for 1 or 3 months. I seem to have tolerated the 1.0 well this past week. Thanks.

## 2023-03-01 ENCOUNTER — OFFICE VISIT (OUTPATIENT)
Dept: FAMILY MEDICINE CLINIC | Facility: CLINIC | Age: 58
End: 2023-03-01
Payer: COMMERCIAL

## 2023-03-01 VITALS
HEART RATE: 86 BPM | BODY MASS INDEX: 35 KG/M2 | TEMPERATURE: 98 F | HEIGHT: 66 IN | WEIGHT: 217.8 LBS | SYSTOLIC BLOOD PRESSURE: 100 MMHG | DIASTOLIC BLOOD PRESSURE: 70 MMHG | OXYGEN SATURATION: 98 %

## 2023-03-01 DIAGNOSIS — F33.1 MODERATE EPISODE OF RECURRENT MAJOR DEPRESSIVE DISORDER (HCC): Primary | ICD-10-CM

## 2023-03-01 DIAGNOSIS — K21.9 GASTROESOPHAGEAL REFLUX DISEASE WITHOUT ESOPHAGITIS: ICD-10-CM

## 2023-03-01 DIAGNOSIS — E55.9 VITAMIN D DEFICIENCY, UNSPECIFIED: ICD-10-CM

## 2023-03-01 DIAGNOSIS — E78.5 HYPERLIPIDEMIA ASSOCIATED WITH TYPE 2 DIABETES MELLITUS (HCC): ICD-10-CM

## 2023-03-01 DIAGNOSIS — E66.01 SEVERE OBESITY (BMI 35.0-39.9) WITH COMORBIDITY (HCC): ICD-10-CM

## 2023-03-01 DIAGNOSIS — I10 ESSENTIAL HYPERTENSION WITH GOAL BLOOD PRESSURE LESS THAN 130/80: ICD-10-CM

## 2023-03-01 DIAGNOSIS — Z79.4 TYPE 2 DIABETES MELLITUS WITHOUT COMPLICATION, WITH LONG-TERM CURRENT USE OF INSULIN (HCC): ICD-10-CM

## 2023-03-01 DIAGNOSIS — E11.9 TYPE 2 DIABETES MELLITUS WITHOUT COMPLICATION, WITH LONG-TERM CURRENT USE OF INSULIN (HCC): ICD-10-CM

## 2023-03-01 DIAGNOSIS — E03.9 ACQUIRED HYPOTHYROIDISM: ICD-10-CM

## 2023-03-01 DIAGNOSIS — E11.69 HYPERLIPIDEMIA ASSOCIATED WITH TYPE 2 DIABETES MELLITUS (HCC): ICD-10-CM

## 2023-03-01 PROCEDURE — 99214 OFFICE O/P EST MOD 30 MIN: CPT | Performed by: PHYSICIAN ASSISTANT

## 2023-03-01 PROCEDURE — 3074F SYST BP LT 130 MM HG: CPT | Performed by: PHYSICIAN ASSISTANT

## 2023-03-01 PROCEDURE — 3078F DIAST BP <80 MM HG: CPT | Performed by: PHYSICIAN ASSISTANT

## 2023-03-01 RX ORDER — ROSUVASTATIN CALCIUM 40 MG/1
40 TABLET, COATED ORAL DAILY
Qty: 90 TABLET | Refills: 1 | Status: SHIPPED | OUTPATIENT
Start: 2023-03-01

## 2023-03-01 RX ORDER — MULTIVITAMIN WITH IRON
TABLET ORAL DAILY
COMMUNITY

## 2023-03-01 SDOH — ECONOMIC STABILITY: FOOD INSECURITY: WITHIN THE PAST 12 MONTHS, THE FOOD YOU BOUGHT JUST DIDN'T LAST AND YOU DIDN'T HAVE MONEY TO GET MORE.: NEVER TRUE

## 2023-03-01 SDOH — ECONOMIC STABILITY: INCOME INSECURITY: HOW HARD IS IT FOR YOU TO PAY FOR THE VERY BASICS LIKE FOOD, HOUSING, MEDICAL CARE, AND HEATING?: NOT HARD AT ALL

## 2023-03-01 SDOH — ECONOMIC STABILITY: HOUSING INSECURITY
IN THE LAST 12 MONTHS, WAS THERE A TIME WHEN YOU DID NOT HAVE A STEADY PLACE TO SLEEP OR SLEPT IN A SHELTER (INCLUDING NOW)?: NO

## 2023-03-01 SDOH — ECONOMIC STABILITY: FOOD INSECURITY: WITHIN THE PAST 12 MONTHS, YOU WORRIED THAT YOUR FOOD WOULD RUN OUT BEFORE YOU GOT MONEY TO BUY MORE.: NEVER TRUE

## 2023-03-01 ASSESSMENT — PATIENT HEALTH QUESTIONNAIRE - PHQ9
SUM OF ALL RESPONSES TO PHQ QUESTIONS 1-9: 2
SUM OF ALL RESPONSES TO PHQ QUESTIONS 1-9: 2
1. LITTLE INTEREST OR PLEASURE IN DOING THINGS: 1
SUM OF ALL RESPONSES TO PHQ QUESTIONS 1-9: 2
SUM OF ALL RESPONSES TO PHQ QUESTIONS 1-9: 2
2. FEELING DOWN, DEPRESSED OR HOPELESS: 1
SUM OF ALL RESPONSES TO PHQ9 QUESTIONS 1 & 2: 2

## 2023-03-01 NOTE — PROGRESS NOTES
[address] [place] [address]  Phone [phone]  Fax [phone]  Joanie GIL    Patient: Wilfredo Billingsley  YOB: 1965  Patient Age 61 y.o. Patient sex: female  Medical Record:  [id number]  Visit Date:3/1/2023   Author:  Lawrence Galindo. [facility] Clinic Note     Chief complaint  Wilfredo Billingsley  is a 61 y.o. female who was seen on 03/02/23  10:43 AM  for the following reasons:    Chief Complaint   Patient presents with    Follow-up       Current Medical problems addressed    She is functioning but has anhedonia. She is working and functioning on trintellix 20mg and abilify 10mg. No suicidal thoughts but feels so vicky. Dm and obesity - stable improved with ozempic  She has lost 12lbs with ozempic and lost 4lbs  just in the last month    Hypothyroid - stable on meds -    ASSESSMENT AND PLAN    ICD-10-CM    1. Moderate episode of recurrent major depressive disorder (HCC)  F33.1 cariprazine hcl (VRAYLAR) 1.5 MG capsule     cariprazine hcl (VRAYLAR) 1.5 MG capsule      2. Essential hypertension with goal blood pressure less than 130/80  I10 rosuvastatin (CRESTOR) 40 MG tablet     Urinalysis with Reflex to Culture     Hemoglobin A1C     Comprehensive Metabolic Panel     Lipid Panel     Microalbumin / Creatinine Urine Ratio     Vitamin D 25 Hydroxy      3. Hyperlipidemia associated with type 2 diabetes mellitus (HCC)  E11.69 rosuvastatin (CRESTOR) 40 MG tablet    E78.5 Urinalysis with Reflex to Culture     Hemoglobin A1C     Comprehensive Metabolic Panel     Lipid Panel     Microalbumin / Creatinine Urine Ratio     Vitamin D 25 Hydroxy      4. Gastroesophageal reflux disease without esophagitis  K21.9 rosuvastatin (CRESTOR) 40 MG tablet      5. Severe obesity (BMI 35.0-39. 9) with comorbidity ([address]  E66.01       6.  Type 2 diabetes mellitus without complication, with long-term current use of insulin (HCC)  E11.9 Urinalysis with Reflex to Culture    Z79.4 Hemoglobin A1C     Comprehensive Metabolic Panel     Lipid Panel     Microalbumin / Creatinine Urine Ratio     Vitamin D 25 Hydroxy     TSH     T4, Free      7. Acquired hypothyroidism  E03.9 TSH     T4, Free      8. Vitamin D deficiency, unspecified  E55.9 Vitamin D 25 Hydroxy         Suha was seen today for follow-up. Diagnoses and all orders for this visit:    Moderate episode of recurrent major depressive disorder (HCC)  -     cariprazine hcl (VRAYLAR) 1.5 MG capsule; Take 1 capsule by mouth daily  -     cariprazine hcl (VRAYLAR) 1.5 MG capsule; Take 1 capsule by mouth daily    Essential hypertension with goal blood pressure less than 130/80  -     rosuvastatin (CRESTOR) 40 MG tablet; Take 1 tablet by mouth daily  -     Urinalysis with Reflex to Culture; Future  -     Hemoglobin A1C; Future  -     Comprehensive Metabolic Panel; Future  -     Lipid Panel; Future  -     Microalbumin / Creatinine Urine Ratio; Future  -     Vitamin D 25 Hydroxy; Future    Hyperlipidemia associated with type 2 diabetes mellitus (HCC)  -     rosuvastatin (CRESTOR) 40 MG tablet; Take 1 tablet by mouth daily  -     Urinalysis with Reflex to Culture; Future  -     Hemoglobin A1C; Future  -     Comprehensive Metabolic Panel; Future  -     Lipid Panel; Future  -     Microalbumin / Creatinine Urine Ratio; Future  -     Vitamin D 25 Hydroxy; Future    Gastroesophageal reflux disease without esophagitis  -     rosuvastatin (CRESTOR) 40 MG tablet; Take 1 tablet by mouth daily    Severe obesity (BMI 35.0-39. 9) with comorbidity ([address]    Type 2 diabetes mellitus without complication, with long-term current use of insulin (HCC)  -     Urinalysis with Reflex to Culture; Future  -     Hemoglobin A1C; Future  -     Comprehensive Metabolic Panel; Future  -     Lipid Panel; Future  -     Microalbumin / Creatinine Urine Ratio; Future  -     Vitamin D 25 Hydroxy; Future  -     TSH;  Future  -     T4, Free; Future    Acquired hypothyroidism  -     TSH; Future  -     T4, Free; Future    Vitamin D deficiency, unspecified  -     Vitamin D 25 Hydroxy; Future    Plan includes the following:  Plan: Well-controlled, continue current medications, medication adherence emphasized and lifestyle modifications recommended   But since depression not responding enough with abilify will switch to vvraylar and recheck in  weks. No follow-up provider specified. Orders Placed This Encounter   Procedures    Urinalysis with Reflex to Culture     Standing Status:   Future     Standing Expiration Date:   3/1/2024     Order Specific Question:   SPECIFY(EX-CATH,MIDSTREAM,CYSTO,ETC)? Answer:   midstream    Hemoglobin A1C     Standing Status:   Future     Standing Expiration Date:   3/1/2024    Comprehensive Metabolic Panel     Standing Status:   Future     Standing Expiration Date:   3/1/2024    Lipid Panel     Standing Status:   Future     Standing Expiration Date:   3/1/2024    Microalbumin / Creatinine Urine Ratio     Standing Status:   Future     Standing Expiration Date:   3/1/2024    Vitamin D 25 Hydroxy     Standing Status:   Future     Standing Expiration Date:   3/1/2024    TSH     Standing Status:   Future     Standing Expiration Date:   3/1/2024    T4, Free     Standing Status:   Future     Standing Expiration Date:   3/1/2024       Past Medical History: Allergies:   Allergies   Allergen Reactions    Metformin Diarrhea       Current Medications:   Medications marked \"taking\" at this time:  Current Outpatient Medications   Medication Sig Dispense Refill    magnesium (MAGNESIUM-OXIDE) 250 MG TABS tablet Take by mouth daily      cariprazine hcl (VRAYLAR) 1.5 MG capsule Take 1 capsule by mouth daily 30 capsule 5    cariprazine hcl (VRAYLAR) 1.5 MG capsule Take 1 capsule by mouth daily 14 capsule 0    rosuvastatin (CRESTOR) 40 MG tablet Take 1 tablet by mouth daily 90 tablet 1    Semaglutide, 1 MG/DOSE, (OZEMPIC, 1 MG/DOSE,) 4 MG/3ML SOPN Inject 1 mg into the skin once a week 9 mL 3    VORTIoxetine HBr (TRINTELLIX) 20 MG TABS tablet Take 1 tablet by mouth daily 90 tablet 1    Insulin Degludec (TRESIBA FLEXTOUCH) 200 UNIT/ML SOPN Inject 64 Units into the skin daily 15 Adjustable Dose Pre-filled Pen Syringe 1    levothyroxine (SYNTHROID) 112 MCG tablet Take 1 tablet by mouth every morning (before breakfast) 90 tablet 1    lisinopril (PRINIVIL;ZESTRIL) 10 MG tablet Take 1 tablet by mouth daily 90 tablet 1    pantoprazole (PROTONIX) 40 MG tablet Take 1 tablet by mouth daily TAKE ONE TABLET BY MOUTH ONCE DAILY FOR GASTROESOPHAGEAL REFLUX 30 tablet 2    Semaglutide,0.25 or 0.5MG/DOS, (OZEMPIC, 0.25 OR 0.5 MG/DOSE,) 2 MG/1.5ML SOPN Inject 0.5 mg into the skin every 7 days 9 Adjustable Dose Pre-filled Pen Syringe 1    Insulin Pen Needle 31G X 5 MM MISC 1 each by Other route daily 100 each 5    CPAP Machine MISC by Other route      Probiotic Acidophilus (FLORANEX) TABS Take by mouth      ascorbic acid (VITAMIN C) 500 MG tablet Take by mouth      calcium carbonate 1500 (600 Ca) MG TABS tablet Take 600 mg by mouth 2 times daily      Cholecalciferol 50 MCG (2000 UT) TABS Take 10,000 Int'l Units by mouth      estrogens, conjugated,-methylTESTOSTERone (ESTRATEST HS) 0.625-1.25 MG per tablet Take 1 tablet by mouth daily. fluticasone (FLONASE) 50 MCG/ACT nasal spray 2 sprays by Nasal route daily      hydrocortisone valerate (WEST-BRAYAN) 0.2 % ointment Apply topically 2 times daily      loratadine (CLARITIN) 10 MG tablet Take 10 mg by mouth daily as needed      naphazoline-pheniramine (OPCON-A) 0.027-0.315 % SOLN Apply 1 drop to eye 4 times daily as needed      ondansetron (ZOFRAN-ODT) 4 MG disintegrating tablet Take 4 mg by mouth every 8 hours as needed      valACYclovir (VALTREX) 1 g tablet Take 1,000 mg by mouth 2 times daily      naproxen (NAPROSYN) 500 MG tablet Take 1 tablet by mouth 2 times daily for 7 days 14 tablet 0     No current facility-administered medications for this visit. Current Problem List:   Patient Active Problem List   Diagnosis    Hyperlipidemia associated with type 2 diabetes mellitus ([facility] [address]    Essential hypertension with goal blood pressure less than 130/80    Acquired hypothyroidism    Family history of hyperlipidemia    Anxiety    Severe obesity ([facility] [address]    Family history of ischemic heart disease    Hypocalcemia    HSV infection    Vaginal dysplasia    Type 2 diabetes mellitus without complication, with long-term current use of insulin ([facility] [address]    History of left breast cancer    Severe episode of recurrent major depressive disorder, without psychotic features ([facility] [address]    Family history of sudden cardiac death in brother    Current moderate episode of major depressive disorder (HCC)    Long-term insulin use (HCC)    Gastroesophageal reflux disease without esophagitis    RAFA (obstructive sleep apnea)       Social History:   Social History     Tobacco Use    Smoking status: Never    Smokeless tobacco: Never   Substance Use Topics    Alcohol use: No       Family History:   Family History   Problem Relation Age of Onset    Heart Attack Maternal Grandfather     Diabetes Paternal Grandmother     Diabetes Paternal Grandfather     No Known Problems Brother     Heart Defect Mother     Cancer Maternal Grandmother     Hypertension Father     Diabetes Father     High Cholesterol Mother     Hypertension Mother     High Cholesterol Father        Surgical History:  Past Surgical History:   Procedure Laterality Date    BREAST LUMPECTOMY Left 1994    CHOLECYSTECTOMY      2008    COLONOSCOPY N/A 6/13/2017    Jose--sigmoid hyperplastic polyp--5 year recall    COLONOSCOPY  2008    Dr Micha Hernandez (CERVIX STATUS UNKNOWN)  1993    THYROIDECTOMY  2012       ROS  Review of Systems    /70   Pulse 86   Temp 98 °F (36.7 °C)   Ht 5' 6\" (1.676 m)   Wt 217 lb 12.8 oz (98.8 kg)   SpO2 98%   BMI 35.15 kg/m²   Body mass index is 35.15 kg/m².      Physical Exam    Physical Exam     I have reviewed the patient's past medical history, social history and family history and vitals. We have discussed treatment plan and follow up and given patient instructions. Patient's questions are answered and we will follow up as indicated. Return in about 3 months (around 6/1/2023) for fasting labs 1week prior and chronic follow up  and 1 month for depression recheck. Karyn Jc PA-C  10:43 AM  3/2/2023

## 2023-03-29 ENCOUNTER — TELEMEDICINE (OUTPATIENT)
Dept: FAMILY MEDICINE CLINIC | Facility: CLINIC | Age: 58
End: 2023-03-29
Payer: COMMERCIAL

## 2023-03-29 DIAGNOSIS — Z79.4 TYPE 2 DIABETES MELLITUS WITHOUT COMPLICATION, WITH LONG-TERM CURRENT USE OF INSULIN (HCC): Primary | ICD-10-CM

## 2023-03-29 DIAGNOSIS — F33.1 MODERATE EPISODE OF RECURRENT MAJOR DEPRESSIVE DISORDER (HCC): ICD-10-CM

## 2023-03-29 DIAGNOSIS — E11.9 TYPE 2 DIABETES MELLITUS WITHOUT COMPLICATION, WITH LONG-TERM CURRENT USE OF INSULIN (HCC): Primary | ICD-10-CM

## 2023-03-29 PROCEDURE — 99214 OFFICE O/P EST MOD 30 MIN: CPT | Performed by: PHYSICIAN ASSISTANT

## 2023-03-29 ASSESSMENT — PATIENT HEALTH QUESTIONNAIRE - PHQ9
SUM OF ALL RESPONSES TO PHQ QUESTIONS 1-9: 2
4. FEELING TIRED OR HAVING LITTLE ENERGY: 1
7. TROUBLE CONCENTRATING ON THINGS, SUCH AS READING THE NEWSPAPER OR WATCHING TELEVISION: 0
10. IF YOU CHECKED OFF ANY PROBLEMS, HOW DIFFICULT HAVE THESE PROBLEMS MADE IT FOR YOU TO DO YOUR WORK, TAKE CARE OF THINGS AT HOME, OR GET ALONG WITH OTHER PEOPLE: 0
SUM OF ALL RESPONSES TO PHQ QUESTIONS 1-9: 2
SUM OF ALL RESPONSES TO PHQ9 QUESTIONS 1 & 2: 1
SUM OF ALL RESPONSES TO PHQ QUESTIONS 1-9: 2
9. THOUGHTS THAT YOU WOULD BE BETTER OFF DEAD, OR OF HURTING YOURSELF: 0
5. POOR APPETITE OR OVEREATING: 0
8. MOVING OR SPEAKING SO SLOWLY THAT OTHER PEOPLE COULD HAVE NOTICED. OR THE OPPOSITE, BEING SO FIGETY OR RESTLESS THAT YOU HAVE BEEN MOVING AROUND A LOT MORE THAN USUAL: 0
2. FEELING DOWN, DEPRESSED OR HOPELESS: 0
3. TROUBLE FALLING OR STAYING ASLEEP: 0
6. FEELING BAD ABOUT YOURSELF - OR THAT YOU ARE A FAILURE OR HAVE LET YOURSELF OR YOUR FAMILY DOWN: 0
SUM OF ALL RESPONSES TO PHQ QUESTIONS 1-9: 2
1. LITTLE INTEREST OR PLEASURE IN DOING THINGS: 1

## 2023-03-29 NOTE — PROGRESS NOTES
recorded       There is no height or weight on file to calculate BMI. Physical Exam    Vitals reviewed. Constitutional:       Appearance: Normal appearance. HENT:      Head: Atraumatic. Eyes:      Conjunctiva/sclera: Conjunctivae normal.   Pulmonary:      Effort: Pulmonary effort is normal.   Musculoskeletal:      Cervical back: Neck supple. Skin:     Coloration: Skin is not jaundiced or pale. Neurological:      General: No focal deficit present. Mental Status: He is alert. Psychiatric:         Mood and Affect: Mood normal.         We discussed the expected course, resolution and complications of the diagnosis(es) in detail. Medication risks, benefits, costs, interactions, and alternatives were discussed as indicated. I advised him to contact the office if his condition worsens, changes or fails to improve as anticipated. He expressed understanding with the diagnosis(es) and plan. Pursuant to the emergency declaration under the [facility] waiver authority and the [employer] Act, this Virtual  Visit was conducted, with patient's consent, to reduce the patient's risk of exposure to COVID-19 and provide continuity of care for an established patient. Services were provided through a video synchronous discussion -- on [employer] virtually to substitute for in-person clinic visit  No follow-ups on file. Patient advised to call the office if symptoms worsen. Karyn Enriquez PA-C  5:19 AM  3/31/2023

## 2023-04-25 RX ORDER — INSULIN DEGLUDEC 200 U/ML
INJECTION, SOLUTION SUBCUTANEOUS
Qty: 18 ML | Refills: 1 | Status: SHIPPED | OUTPATIENT
Start: 2023-04-25

## 2023-05-24 DIAGNOSIS — E03.9 ACQUIRED HYPOTHYROIDISM: ICD-10-CM

## 2023-05-24 DIAGNOSIS — Z79.4 TYPE 2 DIABETES MELLITUS WITHOUT COMPLICATION, WITH LONG-TERM CURRENT USE OF INSULIN (HCC): ICD-10-CM

## 2023-05-24 DIAGNOSIS — E11.69 HYPERLIPIDEMIA ASSOCIATED WITH TYPE 2 DIABETES MELLITUS (HCC): ICD-10-CM

## 2023-05-24 DIAGNOSIS — E11.9 TYPE 2 DIABETES MELLITUS WITHOUT COMPLICATION, WITH LONG-TERM CURRENT USE OF INSULIN (HCC): ICD-10-CM

## 2023-05-24 DIAGNOSIS — I10 ESSENTIAL HYPERTENSION WITH GOAL BLOOD PRESSURE LESS THAN 130/80: ICD-10-CM

## 2023-05-24 DIAGNOSIS — E78.5 HYPERLIPIDEMIA ASSOCIATED WITH TYPE 2 DIABETES MELLITUS (HCC): ICD-10-CM

## 2023-05-24 DIAGNOSIS — E55.9 VITAMIN D DEFICIENCY, UNSPECIFIED: ICD-10-CM

## 2023-05-24 LAB
25(OH)D3 SERPL-MCNC: 62.9 NG/ML (ref 30–100)
ALBUMIN SERPL-MCNC: 3.9 G/DL (ref 3.5–5)
ALBUMIN/GLOB SERPL: 1.3 (ref 0.4–1.6)
ALP SERPL-CCNC: 52 U/L (ref 50–136)
ALT SERPL-CCNC: 31 U/L (ref 12–65)
ANION GAP SERPL CALC-SCNC: 6 MMOL/L (ref 2–11)
AST SERPL-CCNC: 21 U/L (ref 15–37)
BILIRUB SERPL-MCNC: 0.6 MG/DL (ref 0.2–1.1)
BUN SERPL-MCNC: 14 MG/DL (ref 6–23)
CALCIUM SERPL-MCNC: 8.1 MG/DL (ref 8.3–10.4)
CHLORIDE SERPL-SCNC: 105 MMOL/L (ref 101–110)
CHOLEST SERPL-MCNC: 133 MG/DL
CO2 SERPL-SCNC: 30 MMOL/L (ref 21–32)
CREAT SERPL-MCNC: 0.9 MG/DL (ref 0.6–1)
GLOBULIN SER CALC-MCNC: 3.1 G/DL (ref 2.8–4.5)
GLUCOSE SERPL-MCNC: 93 MG/DL (ref 65–100)
HDLC SERPL-MCNC: 49 MG/DL (ref 40–60)
HDLC SERPL: 2.7
LDLC SERPL CALC-MCNC: 63.2 MG/DL
POTASSIUM SERPL-SCNC: 4.2 MMOL/L (ref 3.5–5.1)
PROT SERPL-MCNC: 7 G/DL (ref 6.3–8.2)
SODIUM SERPL-SCNC: 141 MMOL/L (ref 133–143)
T4 FREE SERPL-MCNC: 1.1 NG/DL (ref 0.78–1.46)
TRIGL SERPL-MCNC: 104 MG/DL (ref 35–150)
TSH, 3RD GENERATION: 0.78 UIU/ML (ref 0.36–3.74)
VLDLC SERPL CALC-MCNC: 20.8 MG/DL (ref 6–23)

## 2023-05-25 LAB
EST. AVERAGE GLUCOSE BLD GHB EST-MCNC: 128 MG/DL
HBA1C MFR BLD: 6.1 % (ref 4.8–5.6)

## 2023-06-01 ENCOUNTER — OFFICE VISIT (OUTPATIENT)
Dept: FAMILY MEDICINE CLINIC | Facility: CLINIC | Age: 58
End: 2023-06-01
Payer: COMMERCIAL

## 2023-06-01 VITALS
BODY MASS INDEX: 33.85 KG/M2 | TEMPERATURE: 98 F | WEIGHT: 210.6 LBS | SYSTOLIC BLOOD PRESSURE: 128 MMHG | HEART RATE: 81 BPM | OXYGEN SATURATION: 97 % | DIASTOLIC BLOOD PRESSURE: 88 MMHG | HEIGHT: 66 IN

## 2023-06-01 DIAGNOSIS — Z79.4 TYPE 2 DIABETES MELLITUS WITHOUT COMPLICATION, WITH LONG-TERM CURRENT USE OF INSULIN (HCC): ICD-10-CM

## 2023-06-01 DIAGNOSIS — F33.1 MODERATE EPISODE OF RECURRENT MAJOR DEPRESSIVE DISORDER (HCC): Primary | ICD-10-CM

## 2023-06-01 DIAGNOSIS — E03.9 ACQUIRED HYPOTHYROIDISM: ICD-10-CM

## 2023-06-01 DIAGNOSIS — K21.9 GASTROESOPHAGEAL REFLUX DISEASE WITHOUT ESOPHAGITIS: ICD-10-CM

## 2023-06-01 DIAGNOSIS — E78.5 HYPERLIPIDEMIA ASSOCIATED WITH TYPE 2 DIABETES MELLITUS (HCC): ICD-10-CM

## 2023-06-01 DIAGNOSIS — E11.69 HYPERLIPIDEMIA ASSOCIATED WITH TYPE 2 DIABETES MELLITUS (HCC): ICD-10-CM

## 2023-06-01 DIAGNOSIS — E11.9 TYPE 2 DIABETES MELLITUS WITHOUT COMPLICATION, WITH LONG-TERM CURRENT USE OF INSULIN (HCC): ICD-10-CM

## 2023-06-01 DIAGNOSIS — E83.51 HYPOCALCEMIA: ICD-10-CM

## 2023-06-01 DIAGNOSIS — I10 ESSENTIAL HYPERTENSION WITH GOAL BLOOD PRESSURE LESS THAN 130/80: ICD-10-CM

## 2023-06-01 PROCEDURE — 3074F SYST BP LT 130 MM HG: CPT | Performed by: PHYSICIAN ASSISTANT

## 2023-06-01 PROCEDURE — 3078F DIAST BP <80 MM HG: CPT | Performed by: PHYSICIAN ASSISTANT

## 2023-06-01 PROCEDURE — 3044F HG A1C LEVEL LT 7.0%: CPT | Performed by: PHYSICIAN ASSISTANT

## 2023-06-01 PROCEDURE — 99214 OFFICE O/P EST MOD 30 MIN: CPT | Performed by: PHYSICIAN ASSISTANT

## 2023-06-01 RX ORDER — LISINOPRIL 10 MG/1
10 TABLET ORAL DAILY
Qty: 90 TABLET | Refills: 1 | Status: SHIPPED | OUTPATIENT
Start: 2023-06-01

## 2023-06-01 RX ORDER — PANTOPRAZOLE SODIUM 40 MG/1
40 TABLET, DELAYED RELEASE ORAL DAILY
Qty: 30 TABLET | Refills: 2 | Status: SHIPPED | OUTPATIENT
Start: 2023-06-01

## 2023-06-01 RX ORDER — INSULIN DEGLUDEC 200 U/ML
INJECTION, SOLUTION SUBCUTANEOUS
Qty: 18 ML | Refills: 1 | Status: SHIPPED | OUTPATIENT
Start: 2023-06-01

## 2023-06-01 RX ORDER — LEVOTHYROXINE SODIUM 112 UG/1
112 TABLET ORAL
Qty: 90 TABLET | Refills: 1 | Status: SHIPPED | OUTPATIENT
Start: 2023-06-01

## 2023-06-01 RX ORDER — ROSUVASTATIN CALCIUM 40 MG/1
40 TABLET, COATED ORAL DAILY
Qty: 90 TABLET | Refills: 1 | Status: SHIPPED | OUTPATIENT
Start: 2023-06-01

## 2023-06-01 ASSESSMENT — PATIENT HEALTH QUESTIONNAIRE - PHQ9
6. FEELING BAD ABOUT YOURSELF - OR THAT YOU ARE A FAILURE OR HAVE LET YOURSELF OR YOUR FAMILY DOWN: 1
SUM OF ALL RESPONSES TO PHQ QUESTIONS 1-9: 10
4. FEELING TIRED OR HAVING LITTLE ENERGY: 3
3. TROUBLE FALLING OR STAYING ASLEEP: 0
5. POOR APPETITE OR OVEREATING: 0
1. LITTLE INTEREST OR PLEASURE IN DOING THINGS: 3
SUM OF ALL RESPONSES TO PHQ QUESTIONS 1-9: 10
10. IF YOU CHECKED OFF ANY PROBLEMS, HOW DIFFICULT HAVE THESE PROBLEMS MADE IT FOR YOU TO DO YOUR WORK, TAKE CARE OF THINGS AT HOME, OR GET ALONG WITH OTHER PEOPLE: 1
2. FEELING DOWN, DEPRESSED OR HOPELESS: 1
SUM OF ALL RESPONSES TO PHQ QUESTIONS 1-9: 10
9. THOUGHTS THAT YOU WOULD BE BETTER OFF DEAD, OR OF HURTING YOURSELF: 0
7. TROUBLE CONCENTRATING ON THINGS, SUCH AS READING THE NEWSPAPER OR WATCHING TELEVISION: 1
SUM OF ALL RESPONSES TO PHQ9 QUESTIONS 1 & 2: 4
SUM OF ALL RESPONSES TO PHQ QUESTIONS 1-9: 10
8. MOVING OR SPEAKING SO SLOWLY THAT OTHER PEOPLE COULD HAVE NOTICED. OR THE OPPOSITE, BEING SO FIGETY OR RESTLESS THAT YOU HAVE BEEN MOVING AROUND A LOT MORE THAN USUAL: 1

## 2023-06-01 NOTE — PROGRESS NOTES
Metabolic Panel; Future  -     Lipid Panel; Future  -     Microalbumin / Creatinine Urine Ratio; Future  -     Vitamin D 25 Hydroxy; Future    Acquired hypothyroidism  -     levothyroxine (SYNTHROID) 112 MCG tablet; Take 1 tablet by mouth every morning (before breakfast)    Other orders  -     Insulin Pen Needle 31G X 6 MM MISC; 1 each by Does not apply route daily      Plan includes the following:  Plan: Well-controlled, continue current medications, medication adherence emphasized and lifestyle modifications recommended   Get labs and follow up   No follow-up provider specified. Orders Placed This Encounter   Procedures    Urinalysis with Reflex to Culture     Standing Status:   Future     Standing Expiration Date:   6/1/2024     Order Specific Question:   SPECIFY(EX-CATH,MIDSTREAM,CYSTO,ETC)? Answer:   midstream    Hemoglobin A1C     Standing Status:   Future     Standing Expiration Date:   6/1/2024    Comprehensive Metabolic Panel     Standing Status:   Future     Standing Expiration Date:   6/1/2024    Lipid Panel     Standing Status:   Future     Standing Expiration Date:   6/1/2024    Microalbumin / Creatinine Urine Ratio     Standing Status:   Future     Standing Expiration Date:   6/1/2024    Vitamin D 25 Hydroxy     Standing Status:   Future     Standing Expiration Date:   6/1/2024     DIABETES FOOT EXAM       Past Medical History: Allergies:   Allergies   Allergen Reactions    Metformin Diarrhea       Current Medications:   Medications marked \"taking\" at this time:  Current Outpatient Medications   Medication Sig Dispense Refill    Insulin Degludec (TRESIBA FLEXTOUCH) 200 UNIT/ML SOPN INJECT 64 UNITS            SUBCUTANEOUSLY DAILY 18 mL 1    cariprazine hcl (VRAYLAR) 1.5 MG capsule Take 1 capsule by mouth daily 90 capsule 1    lisinopril (PRINIVIL;ZESTRIL) 10 MG tablet Take 1 tablet by mouth daily 90 tablet 1    levothyroxine (SYNTHROID) 112 MCG tablet Take 1 tablet by mouth every morning (before

## 2023-06-04 ASSESSMENT — ENCOUNTER SYMPTOMS
COUGH: 0
SHORTNESS OF BREATH: 0

## 2023-07-17 ENCOUNTER — TELEMEDICINE (OUTPATIENT)
Dept: SLEEP MEDICINE | Age: 58
End: 2023-07-17
Payer: COMMERCIAL

## 2023-07-17 DIAGNOSIS — G47.33 OSA (OBSTRUCTIVE SLEEP APNEA): Primary | ICD-10-CM

## 2023-07-17 PROCEDURE — 99212 OFFICE O/P EST SF 10 MIN: CPT | Performed by: NURSE PRACTITIONER

## 2023-07-17 ASSESSMENT — SLEEP AND FATIGUE QUESTIONNAIRES
ESS TOTAL SCORE: 1
HOW LIKELY ARE YOU TO NOD OFF OR FALL ASLEEP WHILE WATCHING TV: 0
HOW LIKELY ARE YOU TO NOD OFF OR FALL ASLEEP WHILE SITTING AND READING: 0
HOW LIKELY ARE YOU TO NOD OFF OR FALL ASLEEP WHILE LYING DOWN TO REST IN THE AFTERNOON WHEN CIRCUMSTANCES PERMIT: 1
HOW LIKELY ARE YOU TO NOD OFF OR FALL ASLEEP WHEN YOU ARE A PASSENGER IN A CAR FOR AN HOUR WITHOUT A BREAK: 0
HOW LIKELY ARE YOU TO NOD OFF OR FALL ASLEEP WHILE SITTING AND TALKING TO SOMEONE: 0
HOW LIKELY ARE YOU TO NOD OFF OR FALL ASLEEP WHILE SITTING QUIETLY AFTER LUNCH WITHOUT ALCOHOL: 0
HOW LIKELY ARE YOU TO NOD OFF OR FALL ASLEEP IN A CAR, WHILE STOPPED FOR A FEW MINUTES IN TRAFFIC: 0
HOW LIKELY ARE YOU TO NOD OFF OR FALL ASLEEP WHILE SITTING INACTIVE IN A PUBLIC PLACE: 0

## 2023-08-02 DIAGNOSIS — E11.9 TYPE 2 DIABETES MELLITUS WITHOUT COMPLICATION, WITH LONG-TERM CURRENT USE OF INSULIN (HCC): ICD-10-CM

## 2023-08-02 DIAGNOSIS — F33.1 MODERATE EPISODE OF RECURRENT MAJOR DEPRESSIVE DISORDER (HCC): ICD-10-CM

## 2023-08-02 DIAGNOSIS — Z79.4 TYPE 2 DIABETES MELLITUS WITHOUT COMPLICATION, WITH LONG-TERM CURRENT USE OF INSULIN (HCC): ICD-10-CM

## 2023-08-02 RX ORDER — VORTIOXETINE 20 MG/1
TABLET, FILM COATED ORAL
Qty: 90 TABLET | Refills: 1 | Status: SHIPPED | OUTPATIENT
Start: 2023-08-02

## 2023-08-02 RX ORDER — INSULIN DEGLUDEC 200 U/ML
INJECTION, SOLUTION SUBCUTANEOUS
Qty: 18 ML | Refills: 1 | Status: SHIPPED | OUTPATIENT
Start: 2023-08-02

## 2023-09-11 ENCOUNTER — PATIENT MESSAGE (OUTPATIENT)
Dept: FAMILY MEDICINE CLINIC | Facility: CLINIC | Age: 58
End: 2023-09-11

## 2023-09-12 RX ORDER — VALACYCLOVIR HYDROCHLORIDE 1 G/1
1000 TABLET, FILM COATED ORAL 2 TIMES DAILY
Qty: 30 TABLET | Refills: 2 | Status: SHIPPED | OUTPATIENT
Start: 2023-09-12

## 2023-10-30 DIAGNOSIS — F33.1 MODERATE EPISODE OF RECURRENT MAJOR DEPRESSIVE DISORDER (HCC): ICD-10-CM

## 2023-10-31 ENCOUNTER — TELEPHONE (OUTPATIENT)
Dept: FAMILY MEDICINE CLINIC | Facility: CLINIC | Age: 58
End: 2023-10-31

## 2023-10-31 DIAGNOSIS — F33.1 MODERATE EPISODE OF RECURRENT MAJOR DEPRESSIVE DISORDER (HCC): ICD-10-CM

## 2023-11-01 NOTE — TELEPHONE ENCOUNTER
I sent the requested medication/product in.  Please let patient know to check with their pharmacy  Thanks  Tatum GIL

## 2023-11-02 DIAGNOSIS — E11.9 TYPE 2 DIABETES MELLITUS WITHOUT COMPLICATION, WITH LONG-TERM CURRENT USE OF INSULIN (HCC): ICD-10-CM

## 2023-11-02 DIAGNOSIS — Z79.4 TYPE 2 DIABETES MELLITUS WITHOUT COMPLICATION, WITH LONG-TERM CURRENT USE OF INSULIN (HCC): ICD-10-CM

## 2023-11-03 RX ORDER — INSULIN DEGLUDEC 200 U/ML
INJECTION, SOLUTION SUBCUTANEOUS
Qty: 18 ML | Refills: 1 | Status: SHIPPED | OUTPATIENT
Start: 2023-11-03

## 2023-11-16 DIAGNOSIS — Z79.4 TYPE 2 DIABETES MELLITUS WITHOUT COMPLICATION, WITH LONG-TERM CURRENT USE OF INSULIN (HCC): ICD-10-CM

## 2023-11-16 DIAGNOSIS — E11.9 TYPE 2 DIABETES MELLITUS WITHOUT COMPLICATION, WITH LONG-TERM CURRENT USE OF INSULIN (HCC): ICD-10-CM

## 2023-11-16 LAB
25(OH)D3 SERPL-MCNC: 68.4 NG/ML (ref 30–100)
ALBUMIN SERPL-MCNC: 4 G/DL (ref 3.5–5)
ALBUMIN/GLOB SERPL: 1.3 (ref 0.4–1.6)
ALP SERPL-CCNC: 57 U/L (ref 50–136)
ALT SERPL-CCNC: 26 U/L (ref 12–65)
ANION GAP SERPL CALC-SCNC: 6 MMOL/L (ref 2–11)
APPEARANCE UR: CLEAR
AST SERPL-CCNC: 19 U/L (ref 15–37)
BACTERIA URNS QL MICRO: ABNORMAL /HPF
BILIRUB SERPL-MCNC: 0.6 MG/DL (ref 0.2–1.1)
BILIRUB UR QL: NEGATIVE
BUN SERPL-MCNC: 14 MG/DL (ref 6–23)
CALCIUM SERPL-MCNC: 8.4 MG/DL (ref 8.3–10.4)
CASTS URNS QL MICRO: 0 /LPF
CHLORIDE SERPL-SCNC: 104 MMOL/L (ref 101–110)
CHOLEST SERPL-MCNC: 142 MG/DL
CO2 SERPL-SCNC: 28 MMOL/L (ref 21–32)
COLOR UR: ABNORMAL
CREAT SERPL-MCNC: 0.9 MG/DL (ref 0.6–1)
CREAT UR-MCNC: 165 MG/DL
CRYSTALS URNS QL MICRO: 0 /LPF
EPI CELLS #/AREA URNS HPF: ABNORMAL /HPF
GLOBULIN SER CALC-MCNC: 3 G/DL (ref 2.8–4.5)
GLUCOSE SERPL-MCNC: 112 MG/DL (ref 65–100)
GLUCOSE UR STRIP.AUTO-MCNC: NEGATIVE MG/DL
HDLC SERPL-MCNC: 51 MG/DL (ref 40–60)
HDLC SERPL: 2.8
HGB UR QL STRIP: NEGATIVE
KETONES UR QL STRIP.AUTO: NEGATIVE MG/DL
LDLC SERPL CALC-MCNC: 70.2 MG/DL
LEUKOCYTE ESTERASE UR QL STRIP.AUTO: NEGATIVE
MICROALBUMIN UR-MCNC: 0.96 MG/DL
MICROALBUMIN/CREAT UR-RTO: 6 MG/G (ref 0–30)
MUCOUS THREADS URNS QL MICRO: 0 /LPF
NITRITE UR QL STRIP.AUTO: NEGATIVE
OTHER OBSERVATIONS: ABNORMAL
PH UR STRIP: 6.5 (ref 5–9)
POTASSIUM SERPL-SCNC: 4.5 MMOL/L (ref 3.5–5.1)
PROT SERPL-MCNC: 7 G/DL (ref 6.3–8.2)
PROT UR STRIP-MCNC: NEGATIVE MG/DL
RBC #/AREA URNS HPF: 0 /HPF
SODIUM SERPL-SCNC: 138 MMOL/L (ref 133–143)
SP GR UR REFRACTOMETRY: 1.02 (ref 1–1.02)
TRIGL SERPL-MCNC: 104 MG/DL (ref 35–150)
URINE CULTURE IF INDICATED: ABNORMAL
UROBILINOGEN UR QL STRIP.AUTO: 1 EU/DL (ref 0.2–1)
VLDLC SERPL CALC-MCNC: 20.8 MG/DL (ref 6–23)
WBC URNS QL MICRO: ABNORMAL /HPF

## 2023-11-17 LAB
EST. AVERAGE GLUCOSE BLD GHB EST-MCNC: 140 MG/DL
HBA1C MFR BLD: 6.5 % (ref 4.8–5.6)

## 2023-12-04 ENCOUNTER — OFFICE VISIT (OUTPATIENT)
Dept: FAMILY MEDICINE CLINIC | Facility: CLINIC | Age: 58
End: 2023-12-04
Payer: COMMERCIAL

## 2023-12-04 VITALS
HEIGHT: 66 IN | WEIGHT: 215 LBS | OXYGEN SATURATION: 98 % | BODY MASS INDEX: 34.55 KG/M2 | HEART RATE: 97 BPM | SYSTOLIC BLOOD PRESSURE: 122 MMHG | DIASTOLIC BLOOD PRESSURE: 78 MMHG

## 2023-12-04 DIAGNOSIS — Z12.31 ENCOUNTER FOR SCREENING MAMMOGRAM FOR MALIGNANT NEOPLASM OF BREAST: ICD-10-CM

## 2023-12-04 DIAGNOSIS — F33.1 MAJOR DEPRESSIVE DISORDER, RECURRENT, MODERATE (HCC): ICD-10-CM

## 2023-12-04 DIAGNOSIS — K21.9 GASTROESOPHAGEAL REFLUX DISEASE WITHOUT ESOPHAGITIS: ICD-10-CM

## 2023-12-04 DIAGNOSIS — F33.1 MODERATE EPISODE OF RECURRENT MAJOR DEPRESSIVE DISORDER (HCC): ICD-10-CM

## 2023-12-04 DIAGNOSIS — E78.2 MIXED HYPERLIPIDEMIA: ICD-10-CM

## 2023-12-04 DIAGNOSIS — E78.5 HYPERLIPIDEMIA ASSOCIATED WITH TYPE 2 DIABETES MELLITUS (HCC): ICD-10-CM

## 2023-12-04 DIAGNOSIS — Z79.4 TYPE 2 DIABETES MELLITUS WITHOUT COMPLICATION, WITH LONG-TERM CURRENT USE OF INSULIN (HCC): Primary | ICD-10-CM

## 2023-12-04 DIAGNOSIS — Z85.3 PERSONAL HISTORY OF BREAST CANCER: ICD-10-CM

## 2023-12-04 DIAGNOSIS — E11.9 TYPE 2 DIABETES MELLITUS WITHOUT COMPLICATION, WITH LONG-TERM CURRENT USE OF INSULIN (HCC): Primary | ICD-10-CM

## 2023-12-04 DIAGNOSIS — I10 ESSENTIAL HYPERTENSION WITH GOAL BLOOD PRESSURE LESS THAN 130/80: ICD-10-CM

## 2023-12-04 DIAGNOSIS — E55.9 VITAMIN D DEFICIENCY, UNSPECIFIED: ICD-10-CM

## 2023-12-04 DIAGNOSIS — E03.9 ACQUIRED HYPOTHYROIDISM: ICD-10-CM

## 2023-12-04 DIAGNOSIS — E11.69 HYPERLIPIDEMIA ASSOCIATED WITH TYPE 2 DIABETES MELLITUS (HCC): ICD-10-CM

## 2023-12-04 DIAGNOSIS — Z79.4 LONG-TERM INSULIN USE (HCC): ICD-10-CM

## 2023-12-04 PROCEDURE — 3078F DIAST BP <80 MM HG: CPT | Performed by: PHYSICIAN ASSISTANT

## 2023-12-04 PROCEDURE — 3074F SYST BP LT 130 MM HG: CPT | Performed by: PHYSICIAN ASSISTANT

## 2023-12-04 PROCEDURE — 99214 OFFICE O/P EST MOD 30 MIN: CPT | Performed by: PHYSICIAN ASSISTANT

## 2023-12-04 PROCEDURE — 3044F HG A1C LEVEL LT 7.0%: CPT | Performed by: PHYSICIAN ASSISTANT

## 2023-12-04 RX ORDER — LISINOPRIL 10 MG/1
10 TABLET ORAL DAILY
Qty: 90 TABLET | Refills: 1 | Status: SHIPPED | OUTPATIENT
Start: 2023-12-04

## 2023-12-04 RX ORDER — INSULIN DEGLUDEC 200 U/ML
64 INJECTION, SOLUTION SUBCUTANEOUS NIGHTLY
Qty: 18 ML | Refills: 1 | Status: SHIPPED | OUTPATIENT
Start: 2023-12-04

## 2023-12-04 RX ORDER — ROSUVASTATIN CALCIUM 40 MG/1
40 TABLET, COATED ORAL DAILY
Qty: 90 TABLET | Refills: 1 | Status: SHIPPED | OUTPATIENT
Start: 2023-12-04

## 2023-12-04 RX ORDER — PANTOPRAZOLE SODIUM 40 MG/1
40 TABLET, DELAYED RELEASE ORAL DAILY
Qty: 30 TABLET | Refills: 2 | Status: SHIPPED | OUTPATIENT
Start: 2023-12-04

## 2023-12-04 RX ORDER — SEMAGLUTIDE 1.34 MG/ML
1 INJECTION, SOLUTION SUBCUTANEOUS WEEKLY
Qty: 9 ML | Refills: 3 | Status: SHIPPED | OUTPATIENT
Start: 2023-12-04

## 2023-12-04 RX ORDER — LEVOTHYROXINE SODIUM 112 UG/1
112 TABLET ORAL
Qty: 90 TABLET | Refills: 1 | Status: SHIPPED | OUTPATIENT
Start: 2023-12-04

## 2023-12-04 ASSESSMENT — PATIENT HEALTH QUESTIONNAIRE - PHQ9
SUM OF ALL RESPONSES TO PHQ9 QUESTIONS 1 & 2: 2
9. THOUGHTS THAT YOU WOULD BE BETTER OFF DEAD, OR OF HURTING YOURSELF: 0
7. TROUBLE CONCENTRATING ON THINGS, SUCH AS READING THE NEWSPAPER OR WATCHING TELEVISION: 1
5. POOR APPETITE OR OVEREATING: 0
SUM OF ALL RESPONSES TO PHQ QUESTIONS 1-9: 7
10. IF YOU CHECKED OFF ANY PROBLEMS, HOW DIFFICULT HAVE THESE PROBLEMS MADE IT FOR YOU TO DO YOUR WORK, TAKE CARE OF THINGS AT HOME, OR GET ALONG WITH OTHER PEOPLE: 1
SUM OF ALL RESPONSES TO PHQ QUESTIONS 1-9: 7
2. FEELING DOWN, DEPRESSED OR HOPELESS: 1
3. TROUBLE FALLING OR STAYING ASLEEP: 3
8. MOVING OR SPEAKING SO SLOWLY THAT OTHER PEOPLE COULD HAVE NOTICED. OR THE OPPOSITE, BEING SO FIGETY OR RESTLESS THAT YOU HAVE BEEN MOVING AROUND A LOT MORE THAN USUAL: 0
SUM OF ALL RESPONSES TO PHQ QUESTIONS 1-9: 7
1. LITTLE INTEREST OR PLEASURE IN DOING THINGS: 1
4. FEELING TIRED OR HAVING LITTLE ENERGY: 1
SUM OF ALL RESPONSES TO PHQ QUESTIONS 1-9: 7
6. FEELING BAD ABOUT YOURSELF - OR THAT YOU ARE A FAILURE OR HAVE LET YOURSELF OR YOUR FAMILY DOWN: 0

## 2023-12-04 ASSESSMENT — COLUMBIA-SUICIDE SEVERITY RATING SCALE - C-SSRS
5. HAVE YOU STARTED TO WORK OUT OR WORKED OUT THE DETAILS OF HOW TO KILL YOURSELF? DO YOU INTEND TO CARRY OUT THIS PLAN?: NO
3. HAVE YOU BEEN THINKING ABOUT HOW YOU MIGHT KILL YOURSELF?: NO
7. DID THIS OCCUR IN THE LAST THREE MONTHS: NO
4. HAVE YOU HAD THESE THOUGHTS AND HAD SOME INTENTION OF ACTING ON THEM?: NO

## 2023-12-04 ASSESSMENT — ENCOUNTER SYMPTOMS
BACK PAIN: 1
COUGH: 0
SHORTNESS OF BREATH: 0

## 2023-12-04 NOTE — PROGRESS NOTES
Future    Moderate episode of recurrent major depressive disorder (HCC)  -     cariprazine hcl (VRAYLAR) 1.5 MG capsule; Take 1 capsule by mouth daily  -     VORTIoxetine HBr (TRINTELLIX) 20 MG TABS tablet; Take 1 tablet by mouth daily    Acquired hypothyroidism  -     levothyroxine (SYNTHROID) 112 MCG tablet; Take 1 tablet by mouth every morning (before breakfast)  -     TSH; Future  -     T4, Free; Future    Essential hypertension with goal blood pressure less than 130/80  -     lisinopril (PRINIVIL;ZESTRIL) 10 MG tablet; Take 1 tablet by mouth daily  -     rosuvastatin (CRESTOR) 40 MG tablet; Take 1 tablet by mouth daily  -     pantoprazole (PROTONIX) 40 MG tablet; Take 1 tablet by mouth daily TAKE ONE TABLET BY MOUTH ONCE DAILY FOR GASTROESOPHAGEAL REFLUX    Hyperlipidemia associated with type 2 diabetes mellitus (HCC)  -     lisinopril (PRINIVIL;ZESTRIL) 10 MG tablet; Take 1 tablet by mouth daily  -     rosuvastatin (CRESTOR) 40 MG tablet; Take 1 tablet by mouth daily  -     pantoprazole (PROTONIX) 40 MG tablet; Take 1 tablet by mouth daily TAKE ONE TABLET BY MOUTH ONCE DAILY FOR GASTROESOPHAGEAL REFLUX  -     Lipid Panel; Future    Gastroesophageal reflux disease without esophagitis  -     lisinopril (PRINIVIL;ZESTRIL) 10 MG tablet; Take 1 tablet by mouth daily  -     rosuvastatin (CRESTOR) 40 MG tablet; Take 1 tablet by mouth daily  -     pantoprazole (PROTONIX) 40 MG tablet; Take 1 tablet by mouth daily TAKE ONE TABLET BY MOUTH ONCE DAILY FOR GASTROESOPHAGEAL REFLUX    Encounter for screening mammogram for malignant neoplasm of breast  -     CAITIE DIGITAL SCREEN W OR WO CAD BILATERAL; Future    Long-term insulin use (HCC)    Vitamin D deficiency, unspecified  -     Vitamin D 25 Hydroxy; Future    Mixed hyperlipidemia    Personal history of breast cancer  -     CAITIE DIGITAL SCREEN W OR WO CAD BILATERAL;  Future    Major depressive disorder, recurrent, moderate ([address])      Plan includes the following:  Stable on

## 2023-12-29 ENCOUNTER — TELEPHONE (OUTPATIENT)
Dept: ADMINISTRATIVE | Age: 58
End: 2023-12-29

## 2023-12-29 ENCOUNTER — TELEPHONE (OUTPATIENT)
Dept: FAMILY MEDICINE CLINIC | Facility: CLINIC | Age: 58
End: 2023-12-29

## 2023-12-29 NOTE — TELEPHONE ENCOUNTER
Pt is wanting to know if Dr Dilcia Junior can please look at her [employer] message and respond back to her as soon as possible

## 2023-12-29 NOTE — TELEPHONE ENCOUNTER
----- Message from Zenobia Sánchez sent at 12/29/2023  3:07 PM EST -----  Subject: Message to Provider    QUESTIONS  Information for Provider? Pt is wanting to know if Dr Rosita Aguilar can please   look at her [employer] message and respond back to her as soon as possible  ---------------------------------------------------------------------------  --------------  Varinder [place] Ann-Marie  [phone]; OK to leave message on voicemail  ---------------------------------------------------------------------------  --------------  SCRIPT ANSWERS  Relationship to Patient?  Self

## 2024-01-08 ENCOUNTER — TELEMEDICINE (OUTPATIENT)
Dept: FAMILY MEDICINE CLINIC | Facility: CLINIC | Age: 59
End: 2024-01-08

## 2024-01-08 DIAGNOSIS — Z91.199 NO-SHOW FOR APPOINTMENT: Primary | ICD-10-CM

## 2024-01-10 DIAGNOSIS — F33.1 MODERATE EPISODE OF RECURRENT MAJOR DEPRESSIVE DISORDER (HCC): ICD-10-CM

## 2024-01-10 RX ORDER — VORTIOXETINE 20 MG/1
20 TABLET, FILM COATED ORAL DAILY
Qty: 90 TABLET | Refills: 1 | Status: SHIPPED | OUTPATIENT
Start: 2024-01-10

## 2024-01-11 ENCOUNTER — TELEMEDICINE (OUTPATIENT)
Dept: FAMILY MEDICINE CLINIC | Facility: CLINIC | Age: 59
End: 2024-01-11
Payer: COMMERCIAL

## 2024-01-11 DIAGNOSIS — F33.1 MAJOR DEPRESSIVE DISORDER, RECURRENT, MODERATE (HCC): ICD-10-CM

## 2024-01-11 DIAGNOSIS — E03.9 ACQUIRED HYPOTHYROIDISM: Primary | ICD-10-CM

## 2024-01-11 DIAGNOSIS — E78.5 HYPERLIPIDEMIA ASSOCIATED WITH TYPE 2 DIABETES MELLITUS (HCC): ICD-10-CM

## 2024-01-11 DIAGNOSIS — E11.69 HYPERLIPIDEMIA ASSOCIATED WITH TYPE 2 DIABETES MELLITUS (HCC): ICD-10-CM

## 2024-01-11 DIAGNOSIS — F41.9 ANXIETY: ICD-10-CM

## 2024-01-11 PROCEDURE — 99213 OFFICE O/P EST LOW 20 MIN: CPT | Performed by: PHYSICIAN ASSISTANT

## 2024-01-11 RX ORDER — HYDROXYZINE HYDROCHLORIDE 25 MG/1
25 TABLET, FILM COATED ORAL EVERY 4 HOURS PRN
Qty: 40 TABLET | Refills: 0 | Status: SHIPPED | OUTPATIENT
Start: 2024-01-11 | End: 2024-02-10

## 2024-01-11 ASSESSMENT — ANXIETY QUESTIONNAIRES
4. TROUBLE RELAXING: 0
7. FEELING AFRAID AS IF SOMETHING AWFUL MIGHT HAPPEN: 0
2. NOT BEING ABLE TO STOP OR CONTROL WORRYING: 0
5. BEING SO RESTLESS THAT IT IS HARD TO SIT STILL: 1
6. BECOMING EASILY ANNOYED OR IRRITABLE: 0
3. WORRYING TOO MUCH ABOUT DIFFERENT THINGS: 0
IF YOU CHECKED OFF ANY PROBLEMS ON THIS QUESTIONNAIRE, HOW DIFFICULT HAVE THESE PROBLEMS MADE IT FOR YOU TO DO YOUR WORK, TAKE CARE OF THINGS AT HOME, OR GET ALONG WITH OTHER PEOPLE: SOMEWHAT DIFFICULT
GAD7 TOTAL SCORE: 3
1. FEELING NERVOUS, ANXIOUS, OR ON EDGE: 2

## 2024-01-11 ASSESSMENT — PATIENT HEALTH QUESTIONNAIRE - PHQ9
3. TROUBLE FALLING OR STAYING ASLEEP: 1
SUM OF ALL RESPONSES TO PHQ9 QUESTIONS 1 & 2: 3
6. FEELING BAD ABOUT YOURSELF - OR THAT YOU ARE A FAILURE OR HAVE LET YOURSELF OR YOUR FAMILY DOWN: 0
SUM OF ALL RESPONSES TO PHQ QUESTIONS 1-9: 6
9. THOUGHTS THAT YOU WOULD BE BETTER OFF DEAD, OR OF HURTING YOURSELF: 0
7. TROUBLE CONCENTRATING ON THINGS, SUCH AS READING THE NEWSPAPER OR WATCHING TELEVISION: 1
SUM OF ALL RESPONSES TO PHQ QUESTIONS 1-9: 6
8. MOVING OR SPEAKING SO SLOWLY THAT OTHER PEOPLE COULD HAVE NOTICED. OR THE OPPOSITE, BEING SO FIGETY OR RESTLESS THAT YOU HAVE BEEN MOVING AROUND A LOT MORE THAN USUAL: 0
1. LITTLE INTEREST OR PLEASURE IN DOING THINGS: 2
10. IF YOU CHECKED OFF ANY PROBLEMS, HOW DIFFICULT HAVE THESE PROBLEMS MADE IT FOR YOU TO DO YOUR WORK, TAKE CARE OF THINGS AT HOME, OR GET ALONG WITH OTHER PEOPLE: 1
4. FEELING TIRED OR HAVING LITTLE ENERGY: 1
SUM OF ALL RESPONSES TO PHQ QUESTIONS 1-9: 6
5. POOR APPETITE OR OVEREATING: 0
2. FEELING DOWN, DEPRESSED OR HOPELESS: 1
SUM OF ALL RESPONSES TO PHQ QUESTIONS 1-9: 6

## 2024-01-11 NOTE — PROGRESS NOTES
[facility]  [address] [place] [address]  Phone: [phone]  Fax: [phone]  Provider : Karyn Bo PA-C      Patient: Suha Gr  YOB: 1965  Patient Age 58 y.o.  Patient sex: female  Medical Record:  [id number]  Visit Date: 1/11/2024  Author:  Karyn Bo PA-C    Family Practice Virtual  Visit Note Video Conference Note  Location:  To Patients electronic /phone device in their home  From Medical provider     Suha Gr is a 58 y.o. y.o. female  being evaluated by a Virtual Visit (video visit) encounter to address concerns.   Consent:  The patient and/or health care decision maker is aware that that they may receive a bill for this audio-video service, depending on his insurance coverage, and has provided verbal consent to proceed: Yes    Chief Complaint  Suha Gr  is a 58 y.o. female who was seen by synchronous (real-time) audio-video technology on 01/16/24  1:03 PM  for the following reasons:  No chief complaint on file.       Current medical issues addressed today:  She is having more anxiety and depression and they changed her route in fall and she has been more anxious.  She notes that was a big trigger for her. She had missed work on Friday Dec 29th and 30th when it flaired.  She has been back to work   She feels real nervous and doesn't want to go to work.  At work she continues to feels panic and feels like she wants to leave.  She is on vraylar and trintellix.  She is going to retire in middle of April so she is on a 3 month count down.     Dry cough for about a week more often at night.  She is using cpap   ASSESSMENT & PLAN    ICD-10-CM    1. Acquired hypothyroidism  E03.9 TSH     T4, Free      2. Major depressive disorder, recurrent, moderate (HCC)  F33.1       3. Hyperlipidemia associated with type 2 diabetes mellitus (HCC)  E11.69     E78.5       4. Anxiety  F41.9 hydrOXYzine HCl (ATARAX) 25 MG tablet           Diagnoses and all orders for this

## 2024-01-15 DIAGNOSIS — E03.9 ACQUIRED HYPOTHYROIDISM: ICD-10-CM

## 2024-01-15 LAB
T4 FREE SERPL-MCNC: 1.2 NG/DL (ref 0.78–1.46)
TSH, 3RD GENERATION: 3.2 UIU/ML (ref 0.36–3.74)

## 2024-02-01 DIAGNOSIS — Z12.31 ENCOUNTER FOR SCREENING MAMMOGRAM FOR MALIGNANT NEOPLASM OF BREAST: ICD-10-CM

## 2024-02-01 DIAGNOSIS — Z85.3 PERSONAL HISTORY OF BREAST CANCER: ICD-10-CM

## 2024-02-02 DIAGNOSIS — Z79.4 TYPE 2 DIABETES MELLITUS WITHOUT COMPLICATION, WITH LONG-TERM CURRENT USE OF INSULIN (HCC): ICD-10-CM

## 2024-02-02 DIAGNOSIS — E11.9 TYPE 2 DIABETES MELLITUS WITHOUT COMPLICATION, WITH LONG-TERM CURRENT USE OF INSULIN (HCC): ICD-10-CM

## 2024-02-03 DIAGNOSIS — Z79.4 TYPE 2 DIABETES MELLITUS WITHOUT COMPLICATION, WITH LONG-TERM CURRENT USE OF INSULIN (HCC): ICD-10-CM

## 2024-02-03 DIAGNOSIS — E11.9 TYPE 2 DIABETES MELLITUS WITHOUT COMPLICATION, WITH LONG-TERM CURRENT USE OF INSULIN (HCC): ICD-10-CM

## 2024-02-05 RX ORDER — SEMAGLUTIDE 1.34 MG/ML
1 INJECTION, SOLUTION SUBCUTANEOUS WEEKLY
Qty: 9 ML | Refills: 3 | Status: SHIPPED | OUTPATIENT
Start: 2024-02-05

## 2024-02-05 RX ORDER — INSULIN DEGLUDEC 200 U/ML
INJECTION, SOLUTION SUBCUTANEOUS
Qty: 18 ML | Refills: 1 | Status: SHIPPED | OUTPATIENT
Start: 2024-02-05

## 2024-02-19 ENCOUNTER — TELEPHONE (OUTPATIENT)
Dept: FAMILY MEDICINE CLINIC | Facility: CLINIC | Age: 59
End: 2024-02-19

## 2024-02-21 ENCOUNTER — TELEPHONE (OUTPATIENT)
Dept: FAMILY MEDICINE CLINIC | Facility: CLINIC | Age: 59
End: 2024-02-21

## 2024-05-08 DIAGNOSIS — E11.9 TYPE 2 DIABETES MELLITUS WITHOUT COMPLICATION, WITH LONG-TERM CURRENT USE OF INSULIN (HCC): ICD-10-CM

## 2024-05-08 DIAGNOSIS — Z79.4 TYPE 2 DIABETES MELLITUS WITHOUT COMPLICATION, WITH LONG-TERM CURRENT USE OF INSULIN (HCC): ICD-10-CM

## 2024-05-09 RX ORDER — INSULIN DEGLUDEC 200 U/ML
INJECTION, SOLUTION SUBCUTANEOUS
Qty: 18 ML | Refills: 1 | Status: SHIPPED | OUTPATIENT
Start: 2024-05-09

## 2024-05-27 DIAGNOSIS — E03.9 ACQUIRED HYPOTHYROIDISM: ICD-10-CM

## 2024-05-28 RX ORDER — LEVOTHYROXINE SODIUM 112 UG/1
TABLET ORAL
Qty: 90 TABLET | Refills: 0 | Status: SHIPPED | OUTPATIENT
Start: 2024-05-28

## 2024-05-30 DIAGNOSIS — Z79.4 TYPE 2 DIABETES MELLITUS WITHOUT COMPLICATION, WITH LONG-TERM CURRENT USE OF INSULIN (HCC): Primary | ICD-10-CM

## 2024-05-30 DIAGNOSIS — E03.9 ACQUIRED HYPOTHYROIDISM: ICD-10-CM

## 2024-05-30 DIAGNOSIS — E11.9 TYPE 2 DIABETES MELLITUS WITHOUT COMPLICATION, WITH LONG-TERM CURRENT USE OF INSULIN (HCC): Primary | ICD-10-CM

## 2024-05-30 DIAGNOSIS — E55.9 VITAMIN D DEFICIENCY, UNSPECIFIED: ICD-10-CM

## 2024-05-30 DIAGNOSIS — E11.69 HYPERLIPIDEMIA ASSOCIATED WITH TYPE 2 DIABETES MELLITUS (HCC): ICD-10-CM

## 2024-05-30 DIAGNOSIS — E11.9 TYPE 2 DIABETES MELLITUS WITHOUT COMPLICATION, WITH LONG-TERM CURRENT USE OF INSULIN (HCC): ICD-10-CM

## 2024-05-30 DIAGNOSIS — Z79.4 TYPE 2 DIABETES MELLITUS WITHOUT COMPLICATION, WITH LONG-TERM CURRENT USE OF INSULIN (HCC): ICD-10-CM

## 2024-05-30 DIAGNOSIS — E78.5 HYPERLIPIDEMIA ASSOCIATED WITH TYPE 2 DIABETES MELLITUS (HCC): ICD-10-CM

## 2024-05-30 LAB
25(OH)D3 SERPL-MCNC: 61.5 NG/ML (ref 30–100)
ALBUMIN SERPL-MCNC: 3.9 G/DL (ref 3.2–4.6)
ALBUMIN/GLOB SERPL: 1.4 (ref 1–1.9)
ALP SERPL-CCNC: 66 U/L (ref 35–104)
ALT SERPL-CCNC: 36 U/L (ref 12–65)
ANION GAP SERPL CALC-SCNC: 13 MMOL/L (ref 9–18)
AST SERPL-CCNC: 35 U/L (ref 15–37)
BILIRUB SERPL-MCNC: 0.5 MG/DL (ref 0–1.2)
BUN SERPL-MCNC: 11 MG/DL (ref 8–23)
CALCIUM SERPL-MCNC: 8.1 MG/DL (ref 8.8–10.2)
CHLORIDE SERPL-SCNC: 99 MMOL/L (ref 98–107)
CHOLEST SERPL-MCNC: 141 MG/DL (ref 0–200)
CO2 SERPL-SCNC: 24 MMOL/L (ref 20–28)
CREAT SERPL-MCNC: 0.76 MG/DL (ref 0.6–1.1)
EST. AVERAGE GLUCOSE BLD GHB EST-MCNC: 179 MG/DL
GLOBULIN SER CALC-MCNC: 2.7 G/DL (ref 2.3–3.5)
GLUCOSE SERPL-MCNC: 125 MG/DL (ref 70–99)
HBA1C MFR BLD: 7.9 % (ref 0–5.6)
HDLC SERPL-MCNC: 46 MG/DL (ref 40–60)
HDLC SERPL: 3 (ref 0–5)
LDLC SERPL CALC-MCNC: 68 MG/DL (ref 0–100)
POTASSIUM SERPL-SCNC: 4.4 MMOL/L (ref 3.5–5.1)
PROT SERPL-MCNC: 6.6 G/DL (ref 6.3–8.2)
SODIUM SERPL-SCNC: 136 MMOL/L (ref 136–145)
T4 FREE SERPL-MCNC: 1.2 NG/DL (ref 0.9–1.7)
TRIGL SERPL-MCNC: 133 MG/DL (ref 0–150)
TSH, 3RD GENERATION: 3.15 UIU/ML (ref 0.27–4.2)
VLDLC SERPL CALC-MCNC: 27 MG/DL (ref 6–23)

## 2024-06-04 ENCOUNTER — TELEMEDICINE (OUTPATIENT)
Dept: FAMILY MEDICINE CLINIC | Facility: CLINIC | Age: 59
End: 2024-06-04
Payer: COMMERCIAL

## 2024-06-04 DIAGNOSIS — E03.9 ACQUIRED HYPOTHYROIDISM: ICD-10-CM

## 2024-06-04 DIAGNOSIS — E83.51 HYPOCALCEMIA: Primary | ICD-10-CM

## 2024-06-04 DIAGNOSIS — E11.69 HYPERLIPIDEMIA ASSOCIATED WITH TYPE 2 DIABETES MELLITUS (HCC): ICD-10-CM

## 2024-06-04 DIAGNOSIS — E11.9 TYPE 2 DIABETES MELLITUS WITHOUT COMPLICATION, WITH LONG-TERM CURRENT USE OF INSULIN (HCC): ICD-10-CM

## 2024-06-04 DIAGNOSIS — I10 ESSENTIAL HYPERTENSION WITH GOAL BLOOD PRESSURE LESS THAN 130/80: ICD-10-CM

## 2024-06-04 DIAGNOSIS — E78.5 HYPERLIPIDEMIA ASSOCIATED WITH TYPE 2 DIABETES MELLITUS (HCC): ICD-10-CM

## 2024-06-04 DIAGNOSIS — Z79.4 TYPE 2 DIABETES MELLITUS WITHOUT COMPLICATION, WITH LONG-TERM CURRENT USE OF INSULIN (HCC): ICD-10-CM

## 2024-06-04 DIAGNOSIS — K21.9 GASTROESOPHAGEAL REFLUX DISEASE WITHOUT ESOPHAGITIS: ICD-10-CM

## 2024-06-04 DIAGNOSIS — F33.1 MODERATE EPISODE OF RECURRENT MAJOR DEPRESSIVE DISORDER (HCC): ICD-10-CM

## 2024-06-04 PROCEDURE — 3051F HG A1C>EQUAL 7.0%<8.0%: CPT | Performed by: PHYSICIAN ASSISTANT

## 2024-06-04 PROCEDURE — 99214 OFFICE O/P EST MOD 30 MIN: CPT | Performed by: PHYSICIAN ASSISTANT

## 2024-06-04 RX ORDER — LEVOTHYROXINE SODIUM 112 UG/1
TABLET ORAL
Qty: 90 TABLET | Refills: 1 | Status: SHIPPED | OUTPATIENT
Start: 2024-06-04

## 2024-06-04 RX ORDER — LISINOPRIL 10 MG/1
10 TABLET ORAL DAILY
Qty: 90 TABLET | Refills: 1 | Status: SHIPPED | OUTPATIENT
Start: 2024-06-04

## 2024-06-04 RX ORDER — SEMAGLUTIDE 2.68 MG/ML
2 INJECTION, SOLUTION SUBCUTANEOUS WEEKLY
Qty: 3 ML | Refills: 5 | Status: SHIPPED | OUTPATIENT
Start: 2024-06-04

## 2024-06-04 RX ORDER — ROSUVASTATIN CALCIUM 40 MG/1
40 TABLET, COATED ORAL DAILY
Qty: 90 TABLET | Refills: 1 | Status: SHIPPED | OUTPATIENT
Start: 2024-06-04

## 2024-06-04 RX ORDER — INSULIN DEGLUDEC 200 U/ML
INJECTION, SOLUTION SUBCUTANEOUS
Qty: 18 ML | Refills: 5 | Status: SHIPPED | OUTPATIENT
Start: 2024-06-04

## 2024-06-04 ASSESSMENT — PATIENT HEALTH QUESTIONNAIRE - PHQ9
7. TROUBLE CONCENTRATING ON THINGS, SUCH AS READING THE NEWSPAPER OR WATCHING TELEVISION: NOT AT ALL
9. THOUGHTS THAT YOU WOULD BE BETTER OFF DEAD, OR OF HURTING YOURSELF: NOT AT ALL
8. MOVING OR SPEAKING SO SLOWLY THAT OTHER PEOPLE COULD HAVE NOTICED. OR THE OPPOSITE, BEING SO FIGETY OR RESTLESS THAT YOU HAVE BEEN MOVING AROUND A LOT MORE THAN USUAL: NOT AT ALL
6. FEELING BAD ABOUT YOURSELF - OR THAT YOU ARE A FAILURE OR HAVE LET YOURSELF OR YOUR FAMILY DOWN: NOT AT ALL
3. TROUBLE FALLING OR STAYING ASLEEP: MORE THAN HALF THE DAYS
5. POOR APPETITE OR OVEREATING: NOT AT ALL
SUM OF ALL RESPONSES TO PHQ QUESTIONS 1-9: 9
4. FEELING TIRED OR HAVING LITTLE ENERGY: MORE THAN HALF THE DAYS
1. LITTLE INTEREST OR PLEASURE IN DOING THINGS: NEARLY EVERY DAY
10. IF YOU CHECKED OFF ANY PROBLEMS, HOW DIFFICULT HAVE THESE PROBLEMS MADE IT FOR YOU TO DO YOUR WORK, TAKE CARE OF THINGS AT HOME, OR GET ALONG WITH OTHER PEOPLE: SOMEWHAT DIFFICULT
2. FEELING DOWN, DEPRESSED OR HOPELESS: MORE THAN HALF THE DAYS
SUM OF ALL RESPONSES TO PHQ QUESTIONS 1-9: 9
SUM OF ALL RESPONSES TO PHQ9 QUESTIONS 1 & 2: 5

## 2024-06-04 NOTE — PROGRESS NOTES
Use    Smoking status: Never    Smokeless tobacco: Never   Substance Use Topics    Alcohol use: No       Family History:   Family History   Problem Relation Age of Onset    Heart Attack Maternal Grandfather     Diabetes Paternal Grandmother     Diabetes Paternal Grandfather     No Known Problems Brother     Heart Defect Mother     Cancer Maternal Grandmother     Hypertension Father     Diabetes Father     High Cholesterol Mother     Hypertension Mother     High Cholesterol Father        Surgical History:  Past Surgical History:   Procedure Laterality Date    BREAST LUMPECTOMY Left 1994    CHOLECYSTECTOMY      2008    COLONOSCOPY N/A 6/13/2017    Jose--sigmoid hyperplastic polyp--5 year recall    COLONOSCOPY  2008    Dr Ahuja    HYSTERECTOMY (CERVIX STATUS UNKNOWN)  1993    THYROIDECTOMY  2012       ROS  Review of Systems   Constitutional: Negative for fever.   Respiratory: Negative for shortness of breath.    Cardiovascular: Negative for chest pain.   Neurological: Negative for syncope.   Psych anehdonia and depression worse       Vitals:  those vailabe due to teleconference  are noted below provided by the patient's device in their home  Patient-Reported Weight: 220lbs  Patient-Reported Height: 5ft 6in         There is no height or weight on file to calculate BMI.     Physical Exam    Vitals reviewed.   Constitutional:       Appearance: Normal appearance.   HENT:      Head: Atraumatic.   Eyes:      Conjunctiva/sclera: Conjunctivae normal.   Pulmonary:      Effort: Pulmonary effort is normal.   Musculoskeletal:      Cervical back: Neck supple.   Skin:     Coloration: Skin is not jaundiced or pale.   Neurological:      General: No focal deficit present.      Mental Status: Patient is alert.   Psychiatric:         Mood and Affect: Mood normal.         We discussed the expected course, resolution and complications of the diagnosis(es) in detail.  Medication risks, benefits, costs, interactions, and alternatives

## 2024-07-01 NOTE — PROCEDURE: BODY PHOTOGRAPHY
Detail Level: Generalized
Reason For Photography: The patient is obtaining body photography to observe existing suspicious moles and or monitor for the appearance of any new lesions.
Consent: Written consent obtained, risks reviewed for whole body photography. Patient understands that photograph costs may not be covered by insurance, and patient is ultimately responsible for payment.
Was The Entire Body Photographed (Cannot Bill Unless Entire Body Photographed)?: No
Number Of Photographs (Optional- Will Not Render If 0): 2
Whole Body Statement: The whole body was photographed today.
Render In Strict Bullet Format?: No
Continue Regimen: isotretinoin 40 mg capsule. Take one pill once daily
Detail Level: Zone
Plan: Pt experiencing nausea recommend taking accutane after meals

## 2024-07-08 DIAGNOSIS — E03.9 ACQUIRED HYPOTHYROIDISM: ICD-10-CM

## 2024-07-08 DIAGNOSIS — E78.5 HYPERLIPIDEMIA ASSOCIATED WITH TYPE 2 DIABETES MELLITUS (HCC): ICD-10-CM

## 2024-07-08 DIAGNOSIS — E11.69 HYPERLIPIDEMIA ASSOCIATED WITH TYPE 2 DIABETES MELLITUS (HCC): ICD-10-CM

## 2024-07-08 DIAGNOSIS — I10 ESSENTIAL HYPERTENSION WITH GOAL BLOOD PRESSURE LESS THAN 130/80: ICD-10-CM

## 2024-07-08 DIAGNOSIS — K21.9 GASTROESOPHAGEAL REFLUX DISEASE WITHOUT ESOPHAGITIS: ICD-10-CM

## 2024-07-09 RX ORDER — LEVOTHYROXINE SODIUM 112 UG/1
TABLET ORAL
Qty: 90 TABLET | Refills: 1 | Status: SHIPPED | OUTPATIENT
Start: 2024-07-09

## 2024-07-09 RX ORDER — PANTOPRAZOLE SODIUM 40 MG/1
40 TABLET, DELAYED RELEASE ORAL DAILY
Qty: 30 TABLET | Refills: 2 | Status: SHIPPED | OUTPATIENT
Start: 2024-07-09

## 2024-07-17 ENCOUNTER — OFFICE VISIT (OUTPATIENT)
Dept: FAMILY MEDICINE CLINIC | Facility: CLINIC | Age: 59
End: 2024-07-17
Payer: COMMERCIAL

## 2024-07-17 VITALS
WEIGHT: 221 LBS | BODY MASS INDEX: 35.52 KG/M2 | TEMPERATURE: 98 F | SYSTOLIC BLOOD PRESSURE: 118 MMHG | HEART RATE: 106 BPM | DIASTOLIC BLOOD PRESSURE: 78 MMHG | OXYGEN SATURATION: 96 % | HEIGHT: 66 IN

## 2024-07-17 DIAGNOSIS — E11.69 HYPERLIPIDEMIA ASSOCIATED WITH TYPE 2 DIABETES MELLITUS (HCC): ICD-10-CM

## 2024-07-17 DIAGNOSIS — I10 ESSENTIAL HYPERTENSION WITH GOAL BLOOD PRESSURE LESS THAN 130/80: ICD-10-CM

## 2024-07-17 DIAGNOSIS — E78.5 HYPERLIPIDEMIA ASSOCIATED WITH TYPE 2 DIABETES MELLITUS (HCC): ICD-10-CM

## 2024-07-17 DIAGNOSIS — K21.9 GASTROESOPHAGEAL REFLUX DISEASE WITHOUT ESOPHAGITIS: ICD-10-CM

## 2024-07-17 PROCEDURE — 3078F DIAST BP <80 MM HG: CPT | Performed by: PHYSICIAN ASSISTANT

## 2024-07-17 PROCEDURE — 99214 OFFICE O/P EST MOD 30 MIN: CPT | Performed by: PHYSICIAN ASSISTANT

## 2024-07-17 PROCEDURE — 3074F SYST BP LT 130 MM HG: CPT | Performed by: PHYSICIAN ASSISTANT

## 2024-07-17 PROCEDURE — 3051F HG A1C>EQUAL 7.0%<8.0%: CPT | Performed by: PHYSICIAN ASSISTANT

## 2024-07-17 RX ORDER — PANTOPRAZOLE SODIUM 40 MG/1
40 TABLET, DELAYED RELEASE ORAL DAILY
Qty: 90 TABLET | Refills: 3 | Status: SHIPPED | OUTPATIENT
Start: 2024-07-17

## 2024-07-17 NOTE — PROGRESS NOTES
[facility]  [address]   [place] [address]  Phone: [phone]  Fax [phone]  Provider : Karyn Bo PA-C      Patient: Suha Gr  YOB: 1965  Patient Age 59 y.o.  Patient sex: female  Medical Record:  [id number]  Visit Date:7/17/2024   Author:  Karyn Bo PA-C    Family Practice Clinic Note     Chief complaint  Suha Gr  is a 59 y.o. female who was seen on 07/17/24  11:00 AM  for the following reasons:    Chief Complaint   Patient presents with    Depression     Follow up       Current Medical problems addressed    Depression   Improved slightly starting to feel more enjoyment in life     Diabetes - she notes sugar was 110 at last morning glucose and improved since increasing her insulin she is up to 2mg of ozempic and gtting her dose.   Hypertension stable on meds  Reflux stable on meds no blood in stool   ASSESSMENT AND PLAN    ICD-10-CM    1. Essential hypertension with goal blood pressure less than 130/80  I10 pantoprazole (PROTONIX) 40 MG tablet      2. Hyperlipidemia associated with type 2 diabetes mellitus (HCC)  E11.69 pantoprazole (PROTONIX) 40 MG tablet    E78.5       3. Gastroesophageal reflux disease without esophagitis  K21.9 pantoprazole (PROTONIX) 40 MG tablet         Suha was seen today for depression.    Diagnoses and all orders for this visit:    Essential hypertension with goal blood pressure less than 130/80  -     pantoprazole (PROTONIX) 40 MG tablet; Take 1 tablet by mouth daily    Hyperlipidemia associated with type 2 diabetes mellitus (HCC)  -     pantoprazole (PROTONIX) 40 MG tablet; Take 1 tablet by mouth daily    Gastroesophageal reflux disease without esophagitis  -     pantoprazole (PROTONIX) 40 MG tablet; Take 1 tablet by mouth daily      Plan includes the following:   We review both in office blood pressure readings and any reported readings  from home.   Call back if blood pressure remains above  140/90. Discussed goals of therapy

## 2024-07-19 NOTE — PROGRESS NOTES
[facility]  [address]  [place], [place] [id number] [phone]    Patient Name:  Suha Gr  YOB: 1965    Suha Gr, was evaluated through a synchronous (real-time) audio-video encounter. The patient (or guardian if applicable) is aware that this is a billable service, which includes applicable co-pays. This Virtual Visit was conducted with patient's (and/or legal guardian's) consent. Patient identification was verified, and a caregiver was present when appropriate.   The patient was located at Home: [address] [place] [address] [place] [address]  Provider was located at Home (Appt Dept State): [place]  Confirm you are appropriately licensed, registered, or certified to deliver care in the state where the patient is located as indicated above. If you are not or unsure, please re-schedule the visit: Yes, I confirm.          --Zoila Gifford, KLAUDIA - CNP on 7/22/2024 at 10:18 AM             Office Visit 7/22/2024    CHIEF COMPLAINT:    Chief Complaint   Patient presents with    Follow-up    Sleep Apnea    CPAP/BiPAP       HISTORY OF PRESENT ILLNESS:  Patient is being seen today via virtual visit. Patient had a sleep study 6/19/13  with AHI 47.3/hr with desaturations to 72%. She is prescribed APAP 10-16 cm using a full face mask.     Download reveals 100% compliance over the past year with average nightly use 10 hrs 20 min. AHI is 1.7/hr with average pressure used 11.6-14.9 cm. She feels refreshed when waking in the morning. Denies any problems with the full face mask. No issues with pressure. She reports occasional nocturnal awakenings and is usually able to fall back to sleep. She has occasional hip pain in the night.    Her machine is giving the message that motor life has exceeded. A replacement machine will be ordered.     She denies any significant medical changes over the past year. She reports a 15 lb weight gain since retiring in April. She retired from being a .

## 2024-07-22 ENCOUNTER — TELEMEDICINE (OUTPATIENT)
Dept: SLEEP MEDICINE | Age: 59
End: 2024-07-22
Payer: COMMERCIAL

## 2024-07-22 DIAGNOSIS — G47.33 OSA (OBSTRUCTIVE SLEEP APNEA): Primary | ICD-10-CM

## 2024-07-22 PROCEDURE — 99213 OFFICE O/P EST LOW 20 MIN: CPT | Performed by: NURSE PRACTITIONER

## 2024-08-29 DIAGNOSIS — E03.9 ACQUIRED HYPOTHYROIDISM: ICD-10-CM

## 2024-08-29 DIAGNOSIS — E11.9 TYPE 2 DIABETES MELLITUS WITHOUT COMPLICATION, WITH LONG-TERM CURRENT USE OF INSULIN (HCC): ICD-10-CM

## 2024-08-29 DIAGNOSIS — E55.9 VITAMIN D DEFICIENCY, UNSPECIFIED: ICD-10-CM

## 2024-08-29 DIAGNOSIS — E11.69 HYPERLIPIDEMIA ASSOCIATED WITH TYPE 2 DIABETES MELLITUS (HCC): Primary | ICD-10-CM

## 2024-08-29 DIAGNOSIS — Z79.4 TYPE 2 DIABETES MELLITUS WITHOUT COMPLICATION, WITH LONG-TERM CURRENT USE OF INSULIN (HCC): ICD-10-CM

## 2024-08-29 DIAGNOSIS — E78.5 HYPERLIPIDEMIA ASSOCIATED WITH TYPE 2 DIABETES MELLITUS (HCC): Primary | ICD-10-CM

## 2024-09-03 DIAGNOSIS — E03.9 ACQUIRED HYPOTHYROIDISM: ICD-10-CM

## 2024-09-03 DIAGNOSIS — E78.5 HYPERLIPIDEMIA ASSOCIATED WITH TYPE 2 DIABETES MELLITUS (HCC): ICD-10-CM

## 2024-09-03 DIAGNOSIS — Z79.4 TYPE 2 DIABETES MELLITUS WITHOUT COMPLICATION, WITH LONG-TERM CURRENT USE OF INSULIN (HCC): ICD-10-CM

## 2024-09-03 DIAGNOSIS — E11.69 HYPERLIPIDEMIA ASSOCIATED WITH TYPE 2 DIABETES MELLITUS (HCC): ICD-10-CM

## 2024-09-03 DIAGNOSIS — E11.9 TYPE 2 DIABETES MELLITUS WITHOUT COMPLICATION, WITH LONG-TERM CURRENT USE OF INSULIN (HCC): ICD-10-CM

## 2024-09-03 DIAGNOSIS — E55.9 VITAMIN D DEFICIENCY, UNSPECIFIED: ICD-10-CM

## 2024-09-03 LAB
25(OH)D3 SERPL-MCNC: 64.3 NG/ML (ref 30–100)
ALBUMIN SERPL-MCNC: 3.8 G/DL (ref 3.2–4.6)
ALBUMIN/GLOB SERPL: 1.3 (ref 1–1.9)
ALP SERPL-CCNC: 59 U/L (ref 35–104)
ALT SERPL-CCNC: 35 U/L (ref 12–65)
ANION GAP SERPL CALC-SCNC: 10 MMOL/L (ref 9–18)
AST SERPL-CCNC: 31 U/L (ref 15–37)
BILIRUB SERPL-MCNC: 0.5 MG/DL (ref 0–1.2)
BUN SERPL-MCNC: 10 MG/DL (ref 8–23)
CALCIUM SERPL-MCNC: 8.5 MG/DL (ref 8.8–10.2)
CHLORIDE SERPL-SCNC: 101 MMOL/L (ref 98–107)
CHOLEST SERPL-MCNC: 151 MG/DL (ref 0–200)
CO2 SERPL-SCNC: 26 MMOL/L (ref 20–28)
CREAT SERPL-MCNC: 0.8 MG/DL (ref 0.6–1.1)
EST. AVERAGE GLUCOSE BLD GHB EST-MCNC: 181 MG/DL
GLOBULIN SER CALC-MCNC: 2.9 G/DL (ref 2.3–3.5)
GLUCOSE SERPL-MCNC: 107 MG/DL (ref 70–99)
HBA1C MFR BLD: 8 % (ref 0–5.6)
HDLC SERPL-MCNC: 48 MG/DL (ref 40–60)
HDLC SERPL: 3.1 (ref 0–5)
LDLC SERPL CALC-MCNC: 66 MG/DL (ref 0–100)
POTASSIUM SERPL-SCNC: 4.2 MMOL/L (ref 3.5–5.1)
PROT SERPL-MCNC: 6.7 G/DL (ref 6.3–8.2)
SODIUM SERPL-SCNC: 138 MMOL/L (ref 136–145)
T4 FREE SERPL-MCNC: 1.2 NG/DL (ref 0.9–1.7)
TRIGL SERPL-MCNC: 186 MG/DL (ref 0–150)
TSH, 3RD GENERATION: 3.46 UIU/ML (ref 0.27–4.2)
VLDLC SERPL CALC-MCNC: 37 MG/DL (ref 6–23)

## 2024-09-05 ENCOUNTER — OFFICE VISIT (OUTPATIENT)
Dept: FAMILY MEDICINE CLINIC | Facility: CLINIC | Age: 59
End: 2024-09-05
Payer: COMMERCIAL

## 2024-09-05 VITALS
WEIGHT: 224.4 LBS | BODY MASS INDEX: 36.07 KG/M2 | HEART RATE: 81 BPM | DIASTOLIC BLOOD PRESSURE: 88 MMHG | SYSTOLIC BLOOD PRESSURE: 126 MMHG | OXYGEN SATURATION: 98 % | TEMPERATURE: 97 F | HEIGHT: 66 IN

## 2024-09-05 DIAGNOSIS — Z79.4 TYPE 2 DIABETES MELLITUS WITHOUT COMPLICATION, WITH LONG-TERM CURRENT USE OF INSULIN (HCC): ICD-10-CM

## 2024-09-05 DIAGNOSIS — K21.9 GASTROESOPHAGEAL REFLUX DISEASE WITHOUT ESOPHAGITIS: ICD-10-CM

## 2024-09-05 DIAGNOSIS — E03.9 ACQUIRED HYPOTHYROIDISM: ICD-10-CM

## 2024-09-05 DIAGNOSIS — E78.5 HYPERLIPIDEMIA ASSOCIATED WITH TYPE 2 DIABETES MELLITUS (HCC): ICD-10-CM

## 2024-09-05 DIAGNOSIS — E66.01 SEVERE OBESITY (BMI 35.0-39.9) WITH COMORBIDITY (HCC): ICD-10-CM

## 2024-09-05 DIAGNOSIS — F33.1 MODERATE EPISODE OF RECURRENT MAJOR DEPRESSIVE DISORDER (HCC): ICD-10-CM

## 2024-09-05 DIAGNOSIS — E11.69 HYPERLIPIDEMIA ASSOCIATED WITH TYPE 2 DIABETES MELLITUS (HCC): ICD-10-CM

## 2024-09-05 DIAGNOSIS — E11.9 TYPE 2 DIABETES MELLITUS WITHOUT COMPLICATION, WITH LONG-TERM CURRENT USE OF INSULIN (HCC): ICD-10-CM

## 2024-09-05 DIAGNOSIS — I10 ESSENTIAL HYPERTENSION WITH GOAL BLOOD PRESSURE LESS THAN 130/80: ICD-10-CM

## 2024-09-05 PROCEDURE — 3079F DIAST BP 80-89 MM HG: CPT | Performed by: PHYSICIAN ASSISTANT

## 2024-09-05 PROCEDURE — 99214 OFFICE O/P EST MOD 30 MIN: CPT | Performed by: PHYSICIAN ASSISTANT

## 2024-09-05 PROCEDURE — 3052F HG A1C>EQUAL 8.0%<EQUAL 9.0%: CPT | Performed by: PHYSICIAN ASSISTANT

## 2024-09-05 PROCEDURE — 3074F SYST BP LT 130 MM HG: CPT | Performed by: PHYSICIAN ASSISTANT

## 2024-09-05 RX ORDER — PANTOPRAZOLE SODIUM 40 MG/1
40 TABLET, DELAYED RELEASE ORAL DAILY
Qty: 90 TABLET | Refills: 3 | Status: SHIPPED | OUTPATIENT
Start: 2024-09-05

## 2024-09-05 RX ORDER — SEMAGLUTIDE 2.68 MG/ML
2 INJECTION, SOLUTION SUBCUTANEOUS WEEKLY
Qty: 3 ML | Refills: 5 | Status: SHIPPED | OUTPATIENT
Start: 2024-09-05

## 2024-09-05 RX ORDER — INSULIN DEGLUDEC 200 U/ML
INJECTION, SOLUTION SUBCUTANEOUS
Qty: 18 ML | Refills: 5 | Status: SHIPPED | OUTPATIENT
Start: 2024-09-05

## 2024-09-05 RX ORDER — ACYCLOVIR 400 MG/1
TABLET ORAL
Qty: 3 EACH | Refills: 3 | Status: SHIPPED | OUTPATIENT
Start: 2024-09-05

## 2024-09-05 RX ORDER — ROSUVASTATIN CALCIUM 40 MG/1
40 TABLET, COATED ORAL DAILY
Qty: 90 TABLET | Refills: 1 | Status: SHIPPED | OUTPATIENT
Start: 2024-09-05

## 2024-09-05 RX ORDER — LEVOTHYROXINE SODIUM 112 UG/1
TABLET ORAL
Qty: 90 TABLET | Refills: 1 | Status: SHIPPED | OUTPATIENT
Start: 2024-09-05

## 2024-09-05 RX ORDER — LISINOPRIL 10 MG/1
10 TABLET ORAL DAILY
Qty: 90 TABLET | Refills: 1 | Status: SHIPPED | OUTPATIENT
Start: 2024-09-05

## 2024-09-05 ASSESSMENT — PATIENT HEALTH QUESTIONNAIRE - PHQ9
2. FEELING DOWN, DEPRESSED OR HOPELESS: NOT AT ALL
SUM OF ALL RESPONSES TO PHQ QUESTIONS 1-9: 1
5. POOR APPETITE OR OVEREATING: NOT AT ALL
10. IF YOU CHECKED OFF ANY PROBLEMS, HOW DIFFICULT HAVE THESE PROBLEMS MADE IT FOR YOU TO DO YOUR WORK, TAKE CARE OF THINGS AT HOME, OR GET ALONG WITH OTHER PEOPLE: NOT DIFFICULT AT ALL
9. THOUGHTS THAT YOU WOULD BE BETTER OFF DEAD, OR OF HURTING YOURSELF: NOT AT ALL
6. FEELING BAD ABOUT YOURSELF - OR THAT YOU ARE A FAILURE OR HAVE LET YOURSELF OR YOUR FAMILY DOWN: NOT AT ALL
SUM OF ALL RESPONSES TO PHQ QUESTIONS 1-9: 1
SUM OF ALL RESPONSES TO PHQ QUESTIONS 1-9: 1
7. TROUBLE CONCENTRATING ON THINGS, SUCH AS READING THE NEWSPAPER OR WATCHING TELEVISION: NOT AT ALL
SUM OF ALL RESPONSES TO PHQ9 QUESTIONS 1 & 2: 1
SUM OF ALL RESPONSES TO PHQ QUESTIONS 1-9: 1
1. LITTLE INTEREST OR PLEASURE IN DOING THINGS: SEVERAL DAYS
8. MOVING OR SPEAKING SO SLOWLY THAT OTHER PEOPLE COULD HAVE NOTICED. OR THE OPPOSITE, BEING SO FIGETY OR RESTLESS THAT YOU HAVE BEEN MOVING AROUND A LOT MORE THAN USUAL: NOT AT ALL
3. TROUBLE FALLING OR STAYING ASLEEP: NOT AT ALL
4. FEELING TIRED OR HAVING LITTLE ENERGY: NOT AT ALL

## 2024-09-10 ASSESSMENT — ENCOUNTER SYMPTOMS
SHORTNESS OF BREATH: 0
COUGH: 0

## 2024-11-15 DIAGNOSIS — F33.1 MODERATE EPISODE OF RECURRENT MAJOR DEPRESSIVE DISORDER (HCC): ICD-10-CM

## 2024-11-15 NOTE — TELEPHONE ENCOUNTER
NEEDING:  - Trintellix--20 mg 1x daily/still has between 20-25 pills in the bottle/90 days supply/[employer]/no phone number provided

## 2024-11-18 NOTE — TELEPHONE ENCOUNTER
I sent the requested medication/product in. Please let patient know to check with their pharmacy  Thanks  Karyn GIL

## 2024-12-03 DIAGNOSIS — E78.5 HYPERLIPIDEMIA ASSOCIATED WITH TYPE 2 DIABETES MELLITUS (HCC): ICD-10-CM

## 2024-12-03 DIAGNOSIS — Z79.4 TYPE 2 DIABETES MELLITUS WITHOUT COMPLICATION, WITH LONG-TERM CURRENT USE OF INSULIN (HCC): ICD-10-CM

## 2024-12-03 DIAGNOSIS — E11.69 HYPERLIPIDEMIA ASSOCIATED WITH TYPE 2 DIABETES MELLITUS (HCC): ICD-10-CM

## 2024-12-03 DIAGNOSIS — E11.9 TYPE 2 DIABETES MELLITUS WITHOUT COMPLICATION, WITH LONG-TERM CURRENT USE OF INSULIN (HCC): ICD-10-CM

## 2024-12-03 LAB
ALBUMIN SERPL-MCNC: 3.6 G/DL (ref 3.2–4.6)
ALBUMIN/GLOB SERPL: 1.3 (ref 1–1.9)
ALP SERPL-CCNC: 59 U/L (ref 35–104)
ALT SERPL-CCNC: 30 U/L (ref 8–45)
ANION GAP SERPL CALC-SCNC: 12 MMOL/L (ref 7–16)
AST SERPL-CCNC: 28 U/L (ref 15–37)
BILIRUB SERPL-MCNC: 0.5 MG/DL (ref 0–1.2)
BUN SERPL-MCNC: 9 MG/DL (ref 8–23)
CALCIUM SERPL-MCNC: 8.5 MG/DL (ref 8.8–10.2)
CHLORIDE SERPL-SCNC: 102 MMOL/L (ref 98–107)
CHOLEST SERPL-MCNC: 130 MG/DL (ref 0–200)
CO2 SERPL-SCNC: 25 MMOL/L (ref 20–29)
CREAT SERPL-MCNC: 0.88 MG/DL (ref 0.6–1.1)
EST. AVERAGE GLUCOSE BLD GHB EST-MCNC: 158 MG/DL
GLOBULIN SER CALC-MCNC: 2.8 G/DL (ref 2.3–3.5)
GLUCOSE SERPL-MCNC: 94 MG/DL (ref 70–99)
HBA1C MFR BLD: 7.1 % (ref 0–5.6)
HDLC SERPL-MCNC: 46 MG/DL (ref 40–60)
HDLC SERPL: 2.8 (ref 0–5)
LDLC SERPL CALC-MCNC: 56 MG/DL (ref 0–100)
POTASSIUM SERPL-SCNC: 4.5 MMOL/L (ref 3.5–5.1)
PROT SERPL-MCNC: 6.4 G/DL (ref 6.3–8.2)
SODIUM SERPL-SCNC: 138 MMOL/L (ref 136–145)
TRIGL SERPL-MCNC: 144 MG/DL (ref 0–150)
VLDLC SERPL CALC-MCNC: 29 MG/DL (ref 6–23)

## 2024-12-05 ENCOUNTER — OFFICE VISIT (OUTPATIENT)
Dept: FAMILY MEDICINE CLINIC | Facility: CLINIC | Age: 59
End: 2024-12-05
Payer: COMMERCIAL

## 2024-12-05 VITALS
HEART RATE: 95 BPM | OXYGEN SATURATION: 96 % | DIASTOLIC BLOOD PRESSURE: 88 MMHG | TEMPERATURE: 98 F | SYSTOLIC BLOOD PRESSURE: 130 MMHG | BODY MASS INDEX: 36.61 KG/M2 | WEIGHT: 227.8 LBS | HEIGHT: 66 IN

## 2024-12-05 DIAGNOSIS — E11.9 TYPE 2 DIABETES MELLITUS WITHOUT COMPLICATION, WITH LONG-TERM CURRENT USE OF INSULIN (HCC): ICD-10-CM

## 2024-12-05 DIAGNOSIS — I10 ESSENTIAL HYPERTENSION WITH GOAL BLOOD PRESSURE LESS THAN 130/80: ICD-10-CM

## 2024-12-05 DIAGNOSIS — Z79.4 TYPE 2 DIABETES MELLITUS WITHOUT COMPLICATION, WITH LONG-TERM CURRENT USE OF INSULIN (HCC): ICD-10-CM

## 2024-12-05 DIAGNOSIS — E03.9 ACQUIRED HYPOTHYROIDISM: ICD-10-CM

## 2024-12-05 DIAGNOSIS — K21.9 GASTROESOPHAGEAL REFLUX DISEASE WITHOUT ESOPHAGITIS: ICD-10-CM

## 2024-12-05 DIAGNOSIS — E78.5 HYPERLIPIDEMIA ASSOCIATED WITH TYPE 2 DIABETES MELLITUS (HCC): ICD-10-CM

## 2024-12-05 DIAGNOSIS — E11.69 HYPERLIPIDEMIA ASSOCIATED WITH TYPE 2 DIABETES MELLITUS (HCC): ICD-10-CM

## 2024-12-05 DIAGNOSIS — F33.1 MODERATE EPISODE OF RECURRENT MAJOR DEPRESSIVE DISORDER (HCC): ICD-10-CM

## 2024-12-05 PROCEDURE — 99214 OFFICE O/P EST MOD 30 MIN: CPT | Performed by: PHYSICIAN ASSISTANT

## 2024-12-05 PROCEDURE — 3051F HG A1C>EQUAL 7.0%<8.0%: CPT | Performed by: PHYSICIAN ASSISTANT

## 2024-12-05 PROCEDURE — 3079F DIAST BP 80-89 MM HG: CPT | Performed by: PHYSICIAN ASSISTANT

## 2024-12-05 PROCEDURE — 3075F SYST BP GE 130 - 139MM HG: CPT | Performed by: PHYSICIAN ASSISTANT

## 2024-12-05 RX ORDER — INSULIN DEGLUDEC 200 U/ML
INJECTION, SOLUTION SUBCUTANEOUS
Qty: 18 ML | Refills: 5 | Status: SHIPPED | OUTPATIENT
Start: 2024-12-05

## 2024-12-05 RX ORDER — SEMAGLUTIDE 2.68 MG/ML
2 INJECTION, SOLUTION SUBCUTANEOUS WEEKLY
Qty: 3 ML | Refills: 5 | Status: SHIPPED | OUTPATIENT
Start: 2024-12-05

## 2024-12-05 RX ORDER — INSULIN LISPRO 100 [IU]/ML
20 INJECTION, SOLUTION INTRAVENOUS; SUBCUTANEOUS
Qty: 15 ADJUSTABLE DOSE PRE-FILLED PEN SYRINGE | Refills: 1 | Status: SHIPPED | OUTPATIENT
Start: 2024-12-05

## 2024-12-05 RX ORDER — LEVOTHYROXINE SODIUM 112 UG/1
TABLET ORAL
Qty: 90 TABLET | Refills: 1 | Status: SHIPPED | OUTPATIENT
Start: 2024-12-05

## 2024-12-05 RX ORDER — LISINOPRIL 10 MG/1
10 TABLET ORAL DAILY
Qty: 90 TABLET | Refills: 1 | Status: SHIPPED | OUTPATIENT
Start: 2024-12-05

## 2024-12-05 RX ORDER — ROSUVASTATIN CALCIUM 40 MG/1
40 TABLET, COATED ORAL DAILY
Qty: 90 TABLET | Refills: 1 | Status: SHIPPED | OUTPATIENT
Start: 2024-12-05

## 2024-12-05 RX ORDER — PANTOPRAZOLE SODIUM 40 MG/1
40 TABLET, DELAYED RELEASE ORAL DAILY
Qty: 90 TABLET | Refills: 3 | Status: SHIPPED | OUTPATIENT
Start: 2024-12-05

## 2024-12-05 ASSESSMENT — PATIENT HEALTH QUESTIONNAIRE - PHQ9
SUM OF ALL RESPONSES TO PHQ QUESTIONS 1-9: 0
SUM OF ALL RESPONSES TO PHQ9 QUESTIONS 1 & 2: 0
SUM OF ALL RESPONSES TO PHQ QUESTIONS 1-9: 0
2. FEELING DOWN, DEPRESSED OR HOPELESS: NOT AT ALL
1. LITTLE INTEREST OR PLEASURE IN DOING THINGS: NOT AT ALL
SUM OF ALL RESPONSES TO PHQ QUESTIONS 1-9: 0
SUM OF ALL RESPONSES TO PHQ QUESTIONS 1-9: 0

## 2024-12-05 NOTE — PROGRESS NOTES
[facility]  [address]   [place] [address]  Phone: [phone]  Fax [phone]  Provider : Karyn Bo PA-C      Patient: Suha Gr  YOB: 1965  Patient Age 59 y.o.  Patient sex: female  Medical Record:  [id number]  Visit Date:12/5/2024   Author:  Karyn Bo PA-C    Family Practice Clinic Note     Chief complaint  Suha Gr  is a 59 y.o. female who was seen on 12/09/24  3:18 AM  for the following reasons:    Chief Complaint   Patient presents with    Follow-up     Chronic care    Discuss Labs     u       Current Medical problems addressed    Suha is a 59-year-old female  presents today for follow-up she has an ongoing history of diabetes and A1c on 12/3/2024 has improved down to 7.1 it was previously 8 on 9/3/2024.  CMP noted sugars now improved and 94 kidney functions remained stable with GFR at 76.  Calcium remains mildly low at 8.5.  We will continue patient on her insulin Tresiba as well as her humulin at lunch and dinner 40 units  and Ozempic.    Depression patient is currently on Trintellix 20 mg and Vraylar 3 mg and reports today symptoms are improved.  Boyfriend is with her and says no motivation - like somedays doesn't want to take a shower or get dressed. She reports about once every 2 weeks he reports about 3 x a week she is not interested in going out to eat.  She sleeps about 10-11 hours of sleep a night.     Hyperlipidemia LDL goal is to be less than 70 and patient is reaching that goal at 56 total cholesterol stable at 130 and triglycerides stable at 144 which is an improvement from 186 back in September.  HDL stable at 46.  Will continue current treatment and recommend continued therapy including exercising 30 minutes 5 times a week and following Mediterranean diet.  We will continue current medications including rosuvastatin.    Reflux patient is currently on Protonix 40 mg and doing well.    Hypothyroidism patient is currently treated with

## 2024-12-19 DIAGNOSIS — Z79.4 TYPE 2 DIABETES MELLITUS WITHOUT COMPLICATION, WITH LONG-TERM CURRENT USE OF INSULIN (HCC): ICD-10-CM

## 2024-12-19 DIAGNOSIS — E11.9 TYPE 2 DIABETES MELLITUS WITHOUT COMPLICATION, WITH LONG-TERM CURRENT USE OF INSULIN (HCC): ICD-10-CM

## 2024-12-20 RX ORDER — ACYCLOVIR 400 MG/1
TABLET ORAL
Qty: 3 EACH | Refills: 0 | Status: SHIPPED | OUTPATIENT
Start: 2024-12-20

## 2024-12-31 DIAGNOSIS — Z79.4 TYPE 2 DIABETES MELLITUS WITHOUT COMPLICATION, WITH LONG-TERM CURRENT USE OF INSULIN (HCC): ICD-10-CM

## 2024-12-31 DIAGNOSIS — E11.9 TYPE 2 DIABETES MELLITUS WITHOUT COMPLICATION, WITH LONG-TERM CURRENT USE OF INSULIN (HCC): ICD-10-CM

## 2025-01-05 RX ORDER — INSULIN DEGLUDEC 200 U/ML
INJECTION, SOLUTION SUBCUTANEOUS
Qty: 18 ML | Refills: 1 | Status: SHIPPED | OUTPATIENT
Start: 2025-01-05

## 2025-01-06 DIAGNOSIS — E11.9 TYPE 2 DIABETES MELLITUS WITHOUT COMPLICATION, WITH LONG-TERM CURRENT USE OF INSULIN (HCC): ICD-10-CM

## 2025-01-06 DIAGNOSIS — Z79.4 TYPE 2 DIABETES MELLITUS WITHOUT COMPLICATION, WITH LONG-TERM CURRENT USE OF INSULIN (HCC): ICD-10-CM

## 2025-01-06 NOTE — TELEPHONE ENCOUNTER
----- Message from Mary GARCIA sent at 1/6/2025 12:44 PM EST -----  Regarding: ECC Message to Provider  ECC Message to Provider    Relationship to Patient: Covered Entity From Pharmacy  Martha     Additional Information: Trying to message the practice but couldn't get through. The patient's order is on hold.  --------------------------------------------------------------------------------------------------------------------------    Call Back Information: OK to leave message on voicemail  Preferred Call Back Number: Phone [phone]

## 2025-01-07 ENCOUNTER — PATIENT MESSAGE (OUTPATIENT)
Dept: FAMILY MEDICINE CLINIC | Facility: CLINIC | Age: 60
End: 2025-01-07

## 2025-01-07 DIAGNOSIS — Z79.4 TYPE 2 DIABETES MELLITUS WITHOUT COMPLICATION, WITH LONG-TERM CURRENT USE OF INSULIN (HCC): ICD-10-CM

## 2025-01-07 DIAGNOSIS — E11.9 TYPE 2 DIABETES MELLITUS WITHOUT COMPLICATION, WITH LONG-TERM CURRENT USE OF INSULIN (HCC): ICD-10-CM

## 2025-01-07 RX ORDER — INSULIN DEGLUDEC 100 U/ML
20 INJECTION, SOLUTION SUBCUTANEOUS DAILY
Qty: 5 ADJUSTABLE DOSE PRE-FILLED PEN SYRINGE | Refills: 1 | Status: SHIPPED | OUTPATIENT
Start: 2025-01-07 | End: 2025-01-09 | Stop reason: SDUPTHER

## 2025-01-09 RX ORDER — INSULIN DEGLUDEC 100 U/ML
90 INJECTION, SOLUTION SUBCUTANEOUS DAILY
Qty: 15 ADJUSTABLE DOSE PRE-FILLED PEN SYRINGE | Refills: 1 | Status: SHIPPED | OUTPATIENT
Start: 2025-01-09

## 2025-01-20 DIAGNOSIS — E11.9 TYPE 2 DIABETES MELLITUS WITHOUT COMPLICATION, WITH LONG-TERM CURRENT USE OF INSULIN (HCC): ICD-10-CM

## 2025-01-20 DIAGNOSIS — Z79.4 TYPE 2 DIABETES MELLITUS WITHOUT COMPLICATION, WITH LONG-TERM CURRENT USE OF INSULIN (HCC): ICD-10-CM

## 2025-01-20 RX ORDER — ACYCLOVIR 400 MG/1
TABLET ORAL
Qty: 3 EACH | Refills: 0 | Status: SHIPPED | OUTPATIENT
Start: 2025-01-20

## 2025-02-27 ENCOUNTER — PATIENT MESSAGE (OUTPATIENT)
Dept: FAMILY MEDICINE CLINIC | Facility: CLINIC | Age: 60
End: 2025-02-27

## 2025-02-27 DIAGNOSIS — Z79.4 TYPE 2 DIABETES MELLITUS WITHOUT COMPLICATION, WITH LONG-TERM CURRENT USE OF INSULIN (HCC): Primary | ICD-10-CM

## 2025-02-27 DIAGNOSIS — E11.9 TYPE 2 DIABETES MELLITUS WITHOUT COMPLICATION, WITH LONG-TERM CURRENT USE OF INSULIN (HCC): Primary | ICD-10-CM

## 2025-02-28 RX ORDER — HYDROCHLOROTHIAZIDE 12.5 MG/1
CAPSULE ORAL
Qty: 6 EACH | Refills: 1 | Status: SHIPPED | OUTPATIENT
Start: 2025-02-28

## 2025-03-07 SDOH — ECONOMIC STABILITY: FOOD INSECURITY: WITHIN THE PAST 12 MONTHS, YOU WORRIED THAT YOUR FOOD WOULD RUN OUT BEFORE YOU GOT MONEY TO BUY MORE.: NEVER TRUE

## 2025-03-07 SDOH — ECONOMIC STABILITY: INCOME INSECURITY: IN THE LAST 12 MONTHS, WAS THERE A TIME WHEN YOU WERE NOT ABLE TO PAY THE MORTGAGE OR RENT ON TIME?: NO

## 2025-03-07 SDOH — ECONOMIC STABILITY: TRANSPORTATION INSECURITY
IN THE PAST 12 MONTHS, HAS LACK OF TRANSPORTATION KEPT YOU FROM MEETINGS, WORK, OR FROM GETTING THINGS NEEDED FOR DAILY LIVING?: NO

## 2025-03-07 SDOH — ECONOMIC STABILITY: FOOD INSECURITY: WITHIN THE PAST 12 MONTHS, THE FOOD YOU BOUGHT JUST DIDN'T LAST AND YOU DIDN'T HAVE MONEY TO GET MORE.: NEVER TRUE

## 2025-03-07 SDOH — ECONOMIC STABILITY: TRANSPORTATION INSECURITY
IN THE PAST 12 MONTHS, HAS THE LACK OF TRANSPORTATION KEPT YOU FROM MEDICAL APPOINTMENTS OR FROM GETTING MEDICATIONS?: NO

## 2025-03-07 ASSESSMENT — PATIENT HEALTH QUESTIONNAIRE - PHQ9
9. THOUGHTS THAT YOU WOULD BE BETTER OFF DEAD, OR OF HURTING YOURSELF: NOT AT ALL
4. FEELING TIRED OR HAVING LITTLE ENERGY: NOT AT ALL
7. TROUBLE CONCENTRATING ON THINGS, SUCH AS READING THE NEWSPAPER OR WATCHING TELEVISION: SEVERAL DAYS
5. POOR APPETITE OR OVEREATING: NOT AT ALL
SUM OF ALL RESPONSES TO PHQ QUESTIONS 1-9: 3
8. MOVING OR SPEAKING SO SLOWLY THAT OTHER PEOPLE COULD HAVE NOTICED. OR THE OPPOSITE, BEING SO FIGETY OR RESTLESS THAT YOU HAVE BEEN MOVING AROUND A LOT MORE THAN USUAL: NOT AT ALL
7. TROUBLE CONCENTRATING ON THINGS, SUCH AS READING THE NEWSPAPER OR WATCHING TELEVISION: SEVERAL DAYS
SUM OF ALL RESPONSES TO PHQ QUESTIONS 1-9: 3
3. TROUBLE FALLING OR STAYING ASLEEP: NOT AT ALL
9. THOUGHTS THAT YOU WOULD BE BETTER OFF DEAD, OR OF HURTING YOURSELF: NOT AT ALL
SUM OF ALL RESPONSES TO PHQ QUESTIONS 1-9: 3
10. IF YOU CHECKED OFF ANY PROBLEMS, HOW DIFFICULT HAVE THESE PROBLEMS MADE IT FOR YOU TO DO YOUR WORK, TAKE CARE OF THINGS AT HOME, OR GET ALONG WITH OTHER PEOPLE: NOT DIFFICULT AT ALL
2. FEELING DOWN, DEPRESSED OR HOPELESS: SEVERAL DAYS
2. FEELING DOWN, DEPRESSED OR HOPELESS: SEVERAL DAYS
SUM OF ALL RESPONSES TO PHQ QUESTIONS 1-9: 3
10. IF YOU CHECKED OFF ANY PROBLEMS, HOW DIFFICULT HAVE THESE PROBLEMS MADE IT FOR YOU TO DO YOUR WORK, TAKE CARE OF THINGS AT HOME, OR GET ALONG WITH OTHER PEOPLE: NOT DIFFICULT AT ALL
8. MOVING OR SPEAKING SO SLOWLY THAT OTHER PEOPLE COULD HAVE NOTICED. OR THE OPPOSITE - BEING SO FIDGETY OR RESTLESS THAT YOU HAVE BEEN MOVING AROUND A LOT MORE THAN USUAL: NOT AT ALL
1. LITTLE INTEREST OR PLEASURE IN DOING THINGS: SEVERAL DAYS
5. POOR APPETITE OR OVEREATING: NOT AT ALL
1. LITTLE INTEREST OR PLEASURE IN DOING THINGS: SEVERAL DAYS
SUM OF ALL RESPONSES TO PHQ QUESTIONS 1-9: 3
4. FEELING TIRED OR HAVING LITTLE ENERGY: NOT AT ALL
3. TROUBLE FALLING OR STAYING ASLEEP: NOT AT ALL
6. FEELING BAD ABOUT YOURSELF - OR THAT YOU ARE A FAILURE OR HAVE LET YOURSELF OR YOUR FAMILY DOWN: NOT AT ALL
6. FEELING BAD ABOUT YOURSELF - OR THAT YOU ARE A FAILURE OR HAVE LET YOURSELF OR YOUR FAMILY DOWN: NOT AT ALL

## 2025-03-10 ENCOUNTER — OFFICE VISIT (OUTPATIENT)
Dept: FAMILY MEDICINE CLINIC | Facility: CLINIC | Age: 60
End: 2025-03-10

## 2025-03-10 VITALS
SYSTOLIC BLOOD PRESSURE: 122 MMHG | HEART RATE: 96 BPM | HEIGHT: 66 IN | BODY MASS INDEX: 36 KG/M2 | OXYGEN SATURATION: 97 % | WEIGHT: 224 LBS | DIASTOLIC BLOOD PRESSURE: 82 MMHG | TEMPERATURE: 98.1 F

## 2025-03-10 DIAGNOSIS — F43.21 GRIEVING: ICD-10-CM

## 2025-03-10 DIAGNOSIS — E66.01 MORBID (SEVERE) OBESITY DUE TO EXCESS CALORIES: ICD-10-CM

## 2025-03-10 DIAGNOSIS — E03.9 ACQUIRED HYPOTHYROIDISM: Primary | ICD-10-CM

## 2025-03-10 DIAGNOSIS — E78.5 HYPERLIPIDEMIA ASSOCIATED WITH TYPE 2 DIABETES MELLITUS (HCC): ICD-10-CM

## 2025-03-10 DIAGNOSIS — L91.8 SKIN TAG: ICD-10-CM

## 2025-03-10 DIAGNOSIS — F33.1 MODERATE EPISODE OF RECURRENT MAJOR DEPRESSIVE DISORDER (HCC): ICD-10-CM

## 2025-03-10 DIAGNOSIS — K21.9 GASTROESOPHAGEAL REFLUX DISEASE WITHOUT ESOPHAGITIS: ICD-10-CM

## 2025-03-10 DIAGNOSIS — I10 ESSENTIAL HYPERTENSION WITH GOAL BLOOD PRESSURE LESS THAN 130/80: ICD-10-CM

## 2025-03-10 DIAGNOSIS — Z79.4 TYPE 2 DIABETES MELLITUS WITHOUT COMPLICATION, WITH LONG-TERM CURRENT USE OF INSULIN (HCC): ICD-10-CM

## 2025-03-10 DIAGNOSIS — E78.2 MIXED HYPERLIPIDEMIA: ICD-10-CM

## 2025-03-10 DIAGNOSIS — E55.9 VITAMIN D DEFICIENCY, UNSPECIFIED: ICD-10-CM

## 2025-03-10 DIAGNOSIS — E11.9 TYPE 2 DIABETES MELLITUS WITHOUT COMPLICATION, WITH LONG-TERM CURRENT USE OF INSULIN (HCC): ICD-10-CM

## 2025-03-10 DIAGNOSIS — E11.69 HYPERLIPIDEMIA ASSOCIATED WITH TYPE 2 DIABETES MELLITUS (HCC): ICD-10-CM

## 2025-03-10 LAB
25(OH)D3 SERPL-MCNC: 69.8 NG/ML (ref 30–100)
ALBUMIN SERPL-MCNC: 3.8 G/DL (ref 3.2–4.6)
ALBUMIN/GLOB SERPL: 1.3 (ref 1–1.9)
ALP SERPL-CCNC: 65 U/L (ref 35–104)
ALT SERPL-CCNC: 25 U/L (ref 8–45)
ANION GAP SERPL CALC-SCNC: 14 MMOL/L (ref 7–16)
AST SERPL-CCNC: 24 U/L (ref 15–37)
BILIRUB SERPL-MCNC: 0.4 MG/DL (ref 0–1.2)
BUN SERPL-MCNC: 9 MG/DL (ref 8–23)
CALCIUM SERPL-MCNC: 8.7 MG/DL (ref 8.8–10.2)
CHLORIDE SERPL-SCNC: 103 MMOL/L (ref 98–107)
CHOLEST SERPL-MCNC: 154 MG/DL (ref 0–200)
CO2 SERPL-SCNC: 22 MMOL/L (ref 20–29)
CREAT SERPL-MCNC: 0.82 MG/DL (ref 0.6–1.1)
CREAT UR-MCNC: 55 MG/DL (ref 28–217)
EST. AVERAGE GLUCOSE BLD GHB EST-MCNC: 143 MG/DL
GLOBULIN SER CALC-MCNC: 3 G/DL (ref 2.3–3.5)
GLUCOSE SERPL-MCNC: 152 MG/DL (ref 70–99)
HBA1C MFR BLD: 6.6 % (ref 0–5.6)
HDLC SERPL-MCNC: 51 MG/DL (ref 40–60)
HDLC SERPL: 3 (ref 0–5)
LDLC SERPL CALC-MCNC: 64 MG/DL (ref 0–100)
MICROALBUMIN UR-MCNC: <1.2 MG/DL (ref 0–20)
MICROALBUMIN/CREAT UR-RTO: NORMAL MG/G (ref 0–30)
POTASSIUM SERPL-SCNC: 4.4 MMOL/L (ref 3.5–5.1)
PROT SERPL-MCNC: 6.8 G/DL (ref 6.3–8.2)
SODIUM SERPL-SCNC: 138 MMOL/L (ref 136–145)
T4 FREE SERPL-MCNC: 1.2 NG/DL (ref 0.9–1.7)
TRIGL SERPL-MCNC: 195 MG/DL (ref 0–150)
TSH, 3RD GENERATION: 2.46 UIU/ML (ref 0.27–4.2)
VLDLC SERPL CALC-MCNC: 39 MG/DL (ref 6–23)

## 2025-03-10 RX ORDER — LEVOTHYROXINE SODIUM 112 UG/1
TABLET ORAL
Qty: 90 TABLET | Refills: 1 | Status: SHIPPED | OUTPATIENT
Start: 2025-03-10

## 2025-03-10 RX ORDER — SEMAGLUTIDE 2.68 MG/ML
2 INJECTION, SOLUTION SUBCUTANEOUS WEEKLY
Qty: 3 ML | Refills: 5 | Status: SHIPPED | OUTPATIENT
Start: 2025-03-10

## 2025-03-10 RX ORDER — LISINOPRIL 10 MG/1
10 TABLET ORAL DAILY
Qty: 90 TABLET | Refills: 1 | Status: SHIPPED | OUTPATIENT
Start: 2025-03-10 | End: 2025-03-12 | Stop reason: SDUPTHER

## 2025-03-10 RX ORDER — ROSUVASTATIN CALCIUM 40 MG/1
40 TABLET, COATED ORAL DAILY
Qty: 90 TABLET | Refills: 1 | Status: SHIPPED | OUTPATIENT
Start: 2025-03-10 | End: 2025-03-12 | Stop reason: SDUPTHER

## 2025-03-10 RX ORDER — PANTOPRAZOLE SODIUM 40 MG/1
40 TABLET, DELAYED RELEASE ORAL DAILY
Qty: 90 TABLET | Refills: 3 | Status: SHIPPED | OUTPATIENT
Start: 2025-03-10

## 2025-03-10 RX ORDER — INSULIN LISPRO 100 [IU]/ML
20 INJECTION, SOLUTION INTRAVENOUS; SUBCUTANEOUS
Qty: 15 ADJUSTABLE DOSE PRE-FILLED PEN SYRINGE | Refills: 1 | Status: SHIPPED | OUTPATIENT
Start: 2025-03-10

## 2025-03-10 RX ORDER — INSULIN DEGLUDEC 100 U/ML
90 INJECTION, SOLUTION SUBCUTANEOUS DAILY
Qty: 15 ADJUSTABLE DOSE PRE-FILLED PEN SYRINGE | Refills: 1 | Status: SHIPPED | OUTPATIENT
Start: 2025-03-10

## 2025-03-10 NOTE — PROGRESS NOTES
[facility]  [address]   [place] [address]  Phone: [phone]  Fax [phone]  Provider : Karyn Bo PA-C      Patient: Suha Gr  YOB: 1965  Patient Age 59 y.o.  Patient sex: female  Medical Record:  [id number]  Visit Date:3/10/2025   Author:  Karyn Bo PA-C    Family Practice Clinic Note     Chief complaint  Shua Gr  is a 59 y.o. female who was seen on 25  11:12 AM  for the following reasons:    Chief Complaint   Patient presents with    Follow-up     Labs not done yet--was unable to get labs done on Friday due to not reaching the office    Other     Wants referral to derm for mole on forehead       Current Medical problems addressed  Grieving but depression stable right now with the business   She notes her   of sepsis  unexpectedly in mid January .  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  She notes her Scientologist has been supportive   She started back at the gym in the last 2 weeks and goal is to get up to 3 days a week.   Hyperlipidemia stable on crestor no side effects  Hypothyroidism stable on meds other than feeling dry skin   Diabetes stable on meds and taking her insulin     Derm referral requested for skin lesion on forehead - she notes its gotten a kerritinized area and been picking at it.   ASSESSMENT AND PLAN    ICD-10-CM    1. Acquired hypothyroidism  E03.9 TSH     T4, Free     levothyroxine (SYNTHROID) 112 MCG tablet     T4, Free     TSH      2. Moderate episode of recurrent major depressive disorder (HCC)  F33.1 cariprazine hcl (VRAYLAR) 3 MG CAPS capsule     VORTIoxetine HBr (TRINTELLIX) 20 MG TABS tablet      3. Hyperlipidemia associated with type 2 diabetes mellitus (HCC)  E11.69 DISCONTINUED: rosuvastatin (CRESTOR) 40 MG tablet    E78.5       4. Type 2 diabetes mellitus without complication, with long-term current use of insulin (HCC)  E11.9 Hemoglobin A1C    Z79.4 Comprehensive Metabolic Panel

## 2025-03-11 ENCOUNTER — PATIENT MESSAGE (OUTPATIENT)
Dept: FAMILY MEDICINE CLINIC | Facility: CLINIC | Age: 60
End: 2025-03-11

## 2025-03-11 DIAGNOSIS — E11.69 HYPERLIPIDEMIA ASSOCIATED WITH TYPE 2 DIABETES MELLITUS (HCC): ICD-10-CM

## 2025-03-11 DIAGNOSIS — E78.5 HYPERLIPIDEMIA ASSOCIATED WITH TYPE 2 DIABETES MELLITUS (HCC): ICD-10-CM

## 2025-03-11 DIAGNOSIS — I10 ESSENTIAL HYPERTENSION WITH GOAL BLOOD PRESSURE LESS THAN 130/80: ICD-10-CM

## 2025-03-13 RX ORDER — LISINOPRIL 10 MG/1
10 TABLET ORAL DAILY
Qty: 90 TABLET | Refills: 1 | Status: SHIPPED | OUTPATIENT
Start: 2025-03-13

## 2025-03-13 RX ORDER — ROSUVASTATIN CALCIUM 40 MG/1
40 TABLET, COATED ORAL DAILY
Qty: 90 TABLET | Refills: 1 | Status: SHIPPED | OUTPATIENT
Start: 2025-03-13

## 2025-03-14 ASSESSMENT — ENCOUNTER SYMPTOMS
COUGH: 0
SHORTNESS OF BREATH: 0

## 2025-03-20 ENCOUNTER — TELEPHONE (OUTPATIENT)
Dept: FAMILY MEDICINE CLINIC | Facility: CLINIC | Age: 60
End: 2025-03-20

## 2025-03-20 ENCOUNTER — RESULTS FOLLOW-UP (OUTPATIENT)
Dept: FAMILY MEDICINE CLINIC | Facility: CLINIC | Age: 60
End: 2025-03-20

## 2025-03-22 ENCOUNTER — PATIENT MESSAGE (OUTPATIENT)
Dept: FAMILY MEDICINE CLINIC | Facility: CLINIC | Age: 60
End: 2025-03-22

## 2025-03-22 DIAGNOSIS — I10 ESSENTIAL HYPERTENSION WITH GOAL BLOOD PRESSURE LESS THAN 130/80: ICD-10-CM

## 2025-03-25 RX ORDER — LISINOPRIL 10 MG/1
10 TABLET ORAL DAILY
Qty: 90 TABLET | Refills: 1 | Status: SHIPPED | OUTPATIENT
Start: 2025-03-25 | End: 2025-03-27 | Stop reason: SDUPTHER

## 2025-04-02 ENCOUNTER — TELEPHONE (OUTPATIENT)
Dept: PRIMARY CARE CLINIC | Facility: CLINIC | Age: 60
End: 2025-04-02

## 2025-04-02 NOTE — TELEPHONE ENCOUNTER
Did PA through covermymeds since patient has been waiting for it to be approved.  Ozempic was approved by insurance - notified Melissa

## 2025-04-25 ENCOUNTER — TELEPHONE (OUTPATIENT)
Dept: FAMILY MEDICINE CLINIC | Facility: CLINIC | Age: 60
End: 2025-04-25

## 2025-04-30 ENCOUNTER — TELEPHONE (OUTPATIENT)
Dept: FAMILY MEDICINE CLINIC | Facility: CLINIC | Age: 60
End: 2025-04-30

## 2025-04-30 NOTE — TELEPHONE ENCOUNTER
Pt called and stated  that pharmacy needs a PA for the following med:      pantoprazole (PROTONIX) 40 MG tablet [[id number]]

## 2025-05-01 ENCOUNTER — APPOINTMENT (OUTPATIENT)
Dept: URBAN - METROPOLITAN AREA CLINIC 329 | Facility: CLINIC | Age: 60
Setting detail: DERMATOLOGY
End: 2025-05-01

## 2025-05-01 DIAGNOSIS — D18.0 HEMANGIOMA: ICD-10-CM | Status: UNCHANGED

## 2025-05-01 DIAGNOSIS — L81.4 OTHER MELANIN HYPERPIGMENTATION: ICD-10-CM | Status: STABLE

## 2025-05-01 DIAGNOSIS — L82.1 OTHER SEBORRHEIC KERATOSIS: ICD-10-CM | Status: UNCHANGED

## 2025-05-01 DIAGNOSIS — D485 NEOPLASM OF UNCERTAIN BEHAVIOR OF SKIN: ICD-10-CM | Status: INADEQUATELY CONTROLLED

## 2025-05-01 DIAGNOSIS — D22 MELANOCYTIC NEVI: ICD-10-CM | Status: STABLE

## 2025-05-01 PROBLEM — D22.5 MELANOCYTIC NEVI OF TRUNK: Status: ACTIVE | Noted: 2025-05-01

## 2025-05-01 PROBLEM — D18.01 HEMANGIOMA OF SKIN AND SUBCUTANEOUS TISSUE: Status: ACTIVE | Noted: 2025-05-01

## 2025-05-01 PROBLEM — D48.5 NEOPLASM OF UNCERTAIN BEHAVIOR OF SKIN: Status: ACTIVE | Noted: 2025-05-01

## 2025-05-01 PROCEDURE — ? BIOPSY BY SHAVE METHOD

## 2025-05-01 PROCEDURE — ? FULL BODY SKIN EXAM

## 2025-05-01 PROCEDURE — 99203 OFFICE O/P NEW LOW 30 MIN: CPT | Mod: 25

## 2025-05-01 PROCEDURE — 11102 TANGNTL BX SKIN SINGLE LES: CPT

## 2025-05-01 PROCEDURE — ? PHOTO-DOCUMENTATION

## 2025-05-01 PROCEDURE — ? COUNSELING

## 2025-05-01 ASSESSMENT — LOCATION ZONE DERM
LOCATION ZONE: FACE
LOCATION ZONE: TRUNK

## 2025-05-01 ASSESSMENT — LOCATION SIMPLE DESCRIPTION DERM
LOCATION SIMPLE: UPPER BACK
LOCATION SIMPLE: LEFT FOREHEAD
LOCATION SIMPLE: LEFT UPPER BACK
LOCATION SIMPLE: RIGHT UPPER BACK

## 2025-05-01 ASSESSMENT — LOCATION DETAILED DESCRIPTION DERM
LOCATION DETAILED: LEFT SUPERIOR FOREHEAD
LOCATION DETAILED: RIGHT MEDIAL UPPER BACK
LOCATION DETAILED: INFERIOR THORACIC SPINE
LOCATION DETAILED: LEFT INFERIOR UPPER BACK
LOCATION DETAILED: LEFT MEDIAL UPPER BACK

## 2025-05-01 NOTE — HPI: EVALUATION OF SKIN LESION(S)
What Type Of Note Output Would You Prefer (Optional)?: Standard Output
Hpi Title: Evaluation of Skin Lesions
How Severe Are Your Spot(S)?: mild
Have Your Spot(S) Been Treated In The Past?: has not been treated
Family Member: Grandmother, maternal

## 2025-05-02 ENCOUNTER — TELEPHONE (OUTPATIENT)
Dept: FAMILY MEDICINE CLINIC | Facility: CLINIC | Age: 60
End: 2025-05-02

## 2025-05-02 NOTE — TELEPHONE ENCOUNTER
Pt is calling in regards to her PA and would like to discuss this with you as soon as possible.    Thank  you

## 2025-05-24 DIAGNOSIS — F33.1 MODERATE EPISODE OF RECURRENT MAJOR DEPRESSIVE DISORDER (HCC): ICD-10-CM

## 2025-05-27 RX ORDER — CARIPRAZINE 3 MG/1
3 CAPSULE, GELATIN COATED ORAL DAILY
Qty: 90 CAPSULE | Refills: 0 | Status: SHIPPED | OUTPATIENT
Start: 2025-05-27

## 2025-06-10 DIAGNOSIS — Z79.4 TYPE 2 DIABETES MELLITUS WITHOUT COMPLICATION, WITH LONG-TERM CURRENT USE OF INSULIN (HCC): ICD-10-CM

## 2025-06-10 DIAGNOSIS — E11.9 TYPE 2 DIABETES MELLITUS WITHOUT COMPLICATION, WITH LONG-TERM CURRENT USE OF INSULIN (HCC): ICD-10-CM

## 2025-07-18 DIAGNOSIS — E11.9 TYPE 2 DIABETES MELLITUS WITHOUT COMPLICATION, WITH LONG-TERM CURRENT USE OF INSULIN (HCC): ICD-10-CM

## 2025-07-18 DIAGNOSIS — Z79.4 TYPE 2 DIABETES MELLITUS WITHOUT COMPLICATION, WITH LONG-TERM CURRENT USE OF INSULIN (HCC): ICD-10-CM

## 2025-07-21 RX ORDER — HYDROCHLOROTHIAZIDE 12.5 MG/1
CAPSULE ORAL
Qty: 9 EACH | Refills: 3 | Status: SHIPPED | OUTPATIENT
Start: 2025-07-21

## 2025-07-25 NOTE — PROGRESS NOTES
[facility]  [address]  [place], [place] [address] [phone]    Patient Name:  Suha Gr  YOB: 1965    Suha Gr, was evaluated through a synchronous (real-time) audio-video encounter. The patient (or guardian if applicable) is aware that this is a billable service, which includes applicable co-pays. This Virtual Visit was conducted with patient's (and/or legal guardian's) consent. Patient identification was verified, and a caregiver was present when appropriate.   The patient was located at Home: [address] [place] [address] [place] [address]  Provider was located at Home (Appt Dept State): [place]  Confirm you are appropriately licensed, registered, or certified to deliver care in the state where the patient is located as indicated above. If you are not or unsure, please re-schedule the visit: Yes, I confirm.          --Zoila Gifford, KLAUDIA - CNP on 7/28/2025 at 11:37 AM             Office Visit 7/28/2025    CHIEF COMPLAINT:    Chief Complaint   Patient presents with    CPAP/BiPAP    Sleep Apnea       HISTORY OF PRESENT ILLNESS:  Patient is being seen today via virtual visit.     Patient had a sleep study 6/19/13  with AHI 47.3/hr with desaturations to 72%. She is prescribed APAP 10-16 cm using a full face mask.      Download reveals 98% compliance over the past year with average nightly use 11 hours 19 min. AHI is 2.4/hr with average pressure used 12.3-15.6 cm.  She denies any issues with mask or pressure.  She is using PAP therapy on a nightly basis.  She received a new machine since last visit and is happy with it.    She reports very little daytime sleepiness or napping.  Denies any significant medical changes.  She reports a 5 pound weight gain since last visit with a current weight of 225 pounds.  She states her  passed away in January with septic shock secondary to a staph infection.       Download        Garrett Scale            7/28/2025     8:53 AM 7/22/2024

## 2025-07-28 ENCOUNTER — TELEMEDICINE (OUTPATIENT)
Dept: SLEEP MEDICINE | Age: 60
End: 2025-07-28
Payer: COMMERCIAL

## 2025-07-28 DIAGNOSIS — G47.33 OSA (OBSTRUCTIVE SLEEP APNEA): Primary | ICD-10-CM

## 2025-07-28 PROCEDURE — 99213 OFFICE O/P EST LOW 20 MIN: CPT | Performed by: NURSE PRACTITIONER

## 2025-07-28 ASSESSMENT — SLEEP AND FATIGUE QUESTIONNAIRES
HOW LIKELY ARE YOU TO NOD OFF OR FALL ASLEEP IN A CAR, WHILE STOPPED FOR A FEW MINUTES IN TRAFFIC: WOULD NEVER DOZE
HOW LIKELY ARE YOU TO NOD OFF OR FALL ASLEEP WHILE SITTING QUIETLY AFTER LUNCH WITHOUT ALCOHOL: WOULD NEVER DOZE
HOW LIKELY ARE YOU TO NOD OFF OR FALL ASLEEP WHILE SITTING AND TALKING TO SOMEONE: WOULD NEVER DOZE
HOW LIKELY ARE YOU TO NOD OFF OR FALL ASLEEP WHILE SITTING AND READING: WOULD NEVER DOZE
ESS TOTAL SCORE: 1
HOW LIKELY ARE YOU TO NOD OFF OR FALL ASLEEP WHEN YOU ARE A PASSENGER IN A CAR FOR AN HOUR WITHOUT A BREAK: WOULD NEVER DOZE
HOW LIKELY ARE YOU TO NOD OFF OR FALL ASLEEP WHILE LYING DOWN TO REST IN THE AFTERNOON WHEN CIRCUMSTANCES PERMIT: SLIGHT CHANCE OF DOZING
HOW LIKELY ARE YOU TO NOD OFF OR FALL ASLEEP WHILE SITTING INACTIVE IN A PUBLIC PLACE: WOULD NEVER DOZE
HOW LIKELY ARE YOU TO NOD OFF OR FALL ASLEEP WHILE WATCHING TV: WOULD NEVER DOZE

## 2025-08-25 DIAGNOSIS — F33.1 MODERATE EPISODE OF RECURRENT MAJOR DEPRESSIVE DISORDER (HCC): ICD-10-CM

## 2025-08-26 RX ORDER — VORTIOXETINE 20 MG/1
20 TABLET, FILM COATED ORAL DAILY
Qty: 90 TABLET | Refills: 0 | Status: SHIPPED | OUTPATIENT
Start: 2025-08-26

## 2025-08-31 DIAGNOSIS — E11.9 TYPE 2 DIABETES MELLITUS WITHOUT COMPLICATION, WITH LONG-TERM CURRENT USE OF INSULIN (HCC): ICD-10-CM

## 2025-08-31 DIAGNOSIS — Z79.4 TYPE 2 DIABETES MELLITUS WITHOUT COMPLICATION, WITH LONG-TERM CURRENT USE OF INSULIN (HCC): ICD-10-CM

## (undated) DEVICE — KENDALL RADIOLUCENT FOAM MONITORING ELECTRODE RECTANGULAR SHAPE: Brand: KENDALL

## (undated) DEVICE — CONNECTOR TBNG OD5-7MM O2 END DISP

## (undated) DEVICE — SYR 5ML 1/5 GRAD LL NSAF LF --

## (undated) DEVICE — SYR 3ML LL TIP 1/10ML GRAD --

## (undated) DEVICE — CANNULA NSL ORAL AD FOR CAPNOFLEX CO2 O2 AIRLFE

## (undated) DEVICE — FORCEPS BX L240CM JAW DIA2.8MM L CAP W/ NDL MIC MESH TOOTH

## (undated) DEVICE — SYR 50ML SLIP TIP NSAF LF STRL --

## (undated) DEVICE — NDL PRT INJ NSAF BLNT 18GX1.5 --

## (undated) DEVICE — CONTAINER PREFIL FRMLN 40ML --